# Patient Record
Sex: FEMALE | Race: WHITE | NOT HISPANIC OR LATINO | Employment: FULL TIME | URBAN - METROPOLITAN AREA
[De-identification: names, ages, dates, MRNs, and addresses within clinical notes are randomized per-mention and may not be internally consistent; named-entity substitution may affect disease eponyms.]

---

## 2018-04-30 ENCOUNTER — OFFICE VISIT (OUTPATIENT)
Dept: PEDIATRICS | Facility: CLINIC | Age: 28
End: 2018-04-30
Payer: COMMERCIAL

## 2018-04-30 VITALS
WEIGHT: 218 LBS | HEART RATE: 101 BPM | HEIGHT: 66 IN | BODY MASS INDEX: 35.03 KG/M2 | SYSTOLIC BLOOD PRESSURE: 110 MMHG | DIASTOLIC BLOOD PRESSURE: 64 MMHG | TEMPERATURE: 99.1 F | OXYGEN SATURATION: 98 %

## 2018-04-30 DIAGNOSIS — F32.0 MILD SINGLE CURRENT EPISODE OF MAJOR DEPRESSIVE DISORDER (H): Primary | ICD-10-CM

## 2018-04-30 LAB
ERYTHROCYTE [DISTWIDTH] IN BLOOD BY AUTOMATED COUNT: 12 % (ref 10–15)
HCT VFR BLD AUTO: 40.8 % (ref 35–47)
HGB BLD-MCNC: 14.1 G/DL (ref 11.7–15.7)
MCH RBC QN AUTO: 30.5 PG (ref 26.5–33)
MCHC RBC AUTO-ENTMCNC: 34.6 G/DL (ref 31.5–36.5)
MCV RBC AUTO: 88 FL (ref 78–100)
PLATELET # BLD AUTO: 232 10E9/L (ref 150–450)
RBC # BLD AUTO: 4.62 10E12/L (ref 3.8–5.2)
WBC # BLD AUTO: 9.6 10E9/L (ref 4–11)

## 2018-04-30 PROCEDURE — 84443 ASSAY THYROID STIM HORMONE: CPT | Performed by: PEDIATRICS

## 2018-04-30 PROCEDURE — 85027 COMPLETE CBC AUTOMATED: CPT | Performed by: PEDIATRICS

## 2018-04-30 PROCEDURE — 99214 OFFICE O/P EST MOD 30 MIN: CPT | Performed by: PEDIATRICS

## 2018-04-30 PROCEDURE — 36415 COLL VENOUS BLD VENIPUNCTURE: CPT | Performed by: PEDIATRICS

## 2018-04-30 ASSESSMENT — ANXIETY QUESTIONNAIRES
1. FEELING NERVOUS, ANXIOUS, OR ON EDGE: MORE THAN HALF THE DAYS
GAD7 TOTAL SCORE: 10
3. WORRYING TOO MUCH ABOUT DIFFERENT THINGS: SEVERAL DAYS
7. FEELING AFRAID AS IF SOMETHING AWFUL MIGHT HAPPEN: MORE THAN HALF THE DAYS
2. NOT BEING ABLE TO STOP OR CONTROL WORRYING: MORE THAN HALF THE DAYS
6. BECOMING EASILY ANNOYED OR IRRITABLE: NEARLY EVERY DAY
5. BEING SO RESTLESS THAT IT IS HARD TO SIT STILL: NOT AT ALL
IF YOU CHECKED OFF ANY PROBLEMS ON THIS QUESTIONNAIRE, HOW DIFFICULT HAVE THESE PROBLEMS MADE IT FOR YOU TO DO YOUR WORK, TAKE CARE OF THINGS AT HOME, OR GET ALONG WITH OTHER PEOPLE: SOMEWHAT DIFFICULT

## 2018-04-30 ASSESSMENT — PATIENT HEALTH QUESTIONNAIRE - PHQ9: 5. POOR APPETITE OR OVEREATING: NOT AT ALL

## 2018-04-30 NOTE — MR AVS SNAPSHOT
After Visit Summary   4/30/2018    Afia Cook    MRN: [id number]           Patient Information     Date Of Birth          1990        Visit Information        Provider Department      4/30/2018 1:20 PM Cristiane Jimenez MD [facility]        Today's Diagnoses     Mild single current episode of major depressive disorder (H)    -  1      Care Instructions    Labs today.    Start zoloft - taper up as instructed.    Follow up in 4-6 weeks.          Follow-ups after your visit        Follow-up notes from your care team     Return in about 4 weeks (around 5/28/2018) for med check.      Who to contact     If you have questions or need follow up information about today's clinic visit or your schedule please contact [facility] directly at [phone].  Normal or non-critical lab and imaging results will be communicated to you by MyChart, letter or phone within 4 business days after the clinic has received the results. If you do not hear from us within 7 days, please contact the clinic through [employer] or phone. If you have a critical or abnormal lab result, we will notify you by phone as soon as possible.  Submit refill requests through [employer] or call your pharmacy and they will forward the refill request to us. Please allow 3 business days for your refill to be completed.          Additional Information About Your Visit        MyChart Information     [employer] gives you secure access to your electronic health record. If you see a primary care provider, you can also send messages to your care team and make appointments. If you have questions, please call your primary care clinic.  If you do not have a primary care provider, please call [phone] and they will assist you.        Care EveryWhere ID     This is your Care EveryWhere ID. This could be used by other organizations to access your [facility] medical records  [id number]        Your Vitals Were     Pulse Temperature  "Height Pulse Oximetry BMI (Body Mass Index)       101 99.1  F (37.3  C) (Oral) 5' 6\" (1.676 m) 98% 35.19 kg/m2        Blood Pressure from Last 3 Encounters:   04/30/18 110/64   11/16/16 106/62   09/25/15 92/62    Weight from Last 3 Encounters:   04/30/18 218 lb (98.9 kg)   11/16/16 196 lb 2 oz (89 kg)   09/25/15 191 lb 2 oz (86.7 kg)              We Performed the Following     CBC with platelets     TSH with free T4 reflex          Today's Medication Changes          These changes are accurate as of 4/30/18  2:10 PM.  If you have any questions, ask your nurse or doctor.               Start taking these medicines.        Dose/Directions    sertraline 50 MG tablet   Commonly known as:  ZOLOFT   Used for:  Mild single current episode of major depressive disorder (H)   Started by:  Cristiane Jimenez MD        Take 1/2 tablet (25 mg) for 1-2 weeks, then increase to 1 tablet orally daily   Quantity:  30 tablet   Refills:  1            Where to get your medicines      These medications were sent to [facility] - [address]   [address], [place] [address]     Phone:  [phone]     sertraline 50 MG tablet                Primary Care Provider Office Phone # Fax #    Vicky New [phone]       [address] [place] [address]        Equal Access to Services     [facility] AH: Hadii jinny corea hadasho Soomaali, waaxda luqadaha, qaybta kaalmada sandra, lenin nix. So [facility] [phone].    ATENCIÓN: Si ana morales, tiene a almanza disposición servicios gratuitos de asistencia lingüística. Llame al [phone].    We comply with applicable federal civil rights laws and [place] laws. We do not discriminate on the basis of race, color, national origin, age, disability, sex, sexual orientation, or gender identity.            Thank you!     Thank you for choosing [facility]  for " your care. Our goal is always to provide you with excellent care. Hearing back from our patients is one way we can continue to improve our services. Please take a few minutes to complete the written survey that you may receive in the mail after your visit with us. Thank you!             Your Updated Medication List - Protect others around you: Learn how to safely use, store and throw away your medicines at www.disposemymeds.org.          This list is accurate as of 4/30/18  2:10 PM.  Always use your most recent med list.                   Brand Name Dispense Instructions for use Diagnosis    cetirizine 10 MG tablet    zyrTEC    30 tablet    Take 10 mg by mouth as needed In the spring and allergies        MIRENA (52 MG) 20 MCG/24HR IUD   Generic drug:  levonorgestrel      1 each by Intrauterine route once        sertraline 50 MG tablet    ZOLOFT    30 tablet    Take 1/2 tablet (25 mg) for 1-2 weeks, then increase to 1 tablet orally daily    Mild single current episode of major depressive disorder (H)       triamcinolone 0.1 % cream    KENALOG    60 g    Apply to affected area BID    Eczema

## 2018-04-30 NOTE — PROGRESS NOTES
"  SUBJECTIVE:   Afia Cook is a 27 year old female who presents to clinic today for the following health issues:      Abnormal Mood Symptoms      Duration:     Description:  Depression: YES  Anxiety: YES  Panic attacks: no      Accompanying signs and symptoms: see PHQ-9 and LATISHA scores    History (similar episodes/previous evaluation): Sees Therapist weekly.    Precipitating or alleviating factors: None    Therapies tried and outcome: none    Patient states she has had \"depression\" since college - her mom noticed her last year of college and encouraged her to see a therapist. 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  Now working at [employer] in [employer] and has been seeing same therapist for the past 3 years.  Going ok, but then in Jan or Feb started to feel down - she is doing everything \"non-medical\" - walking more, seeing chiropractor.  Hasn't been sleeping well - never feels rested in AM.  8 hours seems like a min amount of sleep for her.  Mild snoring. Taking Vit D, MVI, fish oil.  Noticed mood worsens around period her last cycle - but previously unrelated.  Crying more.  Diet same.    Problem list and histories reviewed & adjusted, as indicated.  Additional history: as documented    Reviewed and updated as needed this visit by clinical staff       Reviewed and updated as needed this visit by Provider         ROS:  Constitutional, HEENT, cardiovascular, pulmonary, gi and gu systems are negative, except as otherwise noted.    OBJECTIVE:     /64 (BP Location: Right arm, Cuff Size: Adult Large)  Pulse 101  Temp 99.1  F (37.3  C) (Oral)  Ht 5' 6\" (1.676 m)  Wt 218 lb (98.9 kg)  SpO2 98%  BMI 35.19 kg/m2  Body mass index is 35.19 kg/(m^2).  GENERAL APPEARANCE: healthy, alert and no distress  CV: regular rates and rhythm, normal S1 S2, no S3 or S4 and no murmur, click or rub    Diagnostic Test Results:  none     ASSESSMENT/PLAN:       1. Mild single current episode of major depressive " disorder (H)  New diagnosis - first time on medications - slow taper. Continue other non med therapies and follow up in 4-6 weeks.  Consider sleep study in future due to poor sleep - likely making this worse. R/o thyroid or anemia as causes first.  - sertraline (ZOLOFT) 50 MG tablet; Take 1/2 tablet (25 mg) for 1-2 weeks, then increase to 1 tablet orally daily  Dispense: 30 tablet; Refill: 1  - TSH with free T4 reflex  - CBC with platelets    Patient Instructions   Labs today.    Start zoloft - taper up as instructed.    Follow up in 4-6 weeks.      Cristiane Jimenez MD  [facility]

## 2018-05-01 LAB — TSH SERPL DL<=0.005 MIU/L-ACNC: 1.88 MU/L (ref 0.4–4)

## 2018-05-01 ASSESSMENT — ANXIETY QUESTIONNAIRES: GAD7 TOTAL SCORE: 10

## 2018-05-01 ASSESSMENT — PATIENT HEALTH QUESTIONNAIRE - PHQ9: SUM OF ALL RESPONSES TO PHQ QUESTIONS 1-9: 19

## 2018-06-25 ENCOUNTER — OFFICE VISIT (OUTPATIENT)
Dept: PEDIATRICS | Facility: CLINIC | Age: 28
End: 2018-06-25
Payer: COMMERCIAL

## 2018-06-25 VITALS
HEIGHT: 66 IN | OXYGEN SATURATION: 98 % | TEMPERATURE: 99.3 F | WEIGHT: 210 LBS | HEART RATE: 84 BPM | BODY MASS INDEX: 33.75 KG/M2 | SYSTOLIC BLOOD PRESSURE: 110 MMHG | DIASTOLIC BLOOD PRESSURE: 70 MMHG

## 2018-06-25 DIAGNOSIS — F32.0 MILD SINGLE CURRENT EPISODE OF MAJOR DEPRESSIVE DISORDER (H): ICD-10-CM

## 2018-06-25 PROCEDURE — 99213 OFFICE O/P EST LOW 20 MIN: CPT | Performed by: PEDIATRICS

## 2018-06-25 ASSESSMENT — ANXIETY QUESTIONNAIRES
IF YOU CHECKED OFF ANY PROBLEMS ON THIS QUESTIONNAIRE, HOW DIFFICULT HAVE THESE PROBLEMS MADE IT FOR YOU TO DO YOUR WORK, TAKE CARE OF THINGS AT HOME, OR GET ALONG WITH OTHER PEOPLE: NOT DIFFICULT AT ALL
7. FEELING AFRAID AS IF SOMETHING AWFUL MIGHT HAPPEN: NOT AT ALL
6. BECOMING EASILY ANNOYED OR IRRITABLE: NOT AT ALL
2. NOT BEING ABLE TO STOP OR CONTROL WORRYING: NOT AT ALL
5. BEING SO RESTLESS THAT IT IS HARD TO SIT STILL: NOT AT ALL
1. FEELING NERVOUS, ANXIOUS, OR ON EDGE: SEVERAL DAYS
GAD7 TOTAL SCORE: 1
3. WORRYING TOO MUCH ABOUT DIFFERENT THINGS: NOT AT ALL

## 2018-06-25 ASSESSMENT — PATIENT HEALTH QUESTIONNAIRE - PHQ9: 5. POOR APPETITE OR OVEREATING: NOT AT ALL

## 2018-06-25 NOTE — MR AVS SNAPSHOT
After Visit Summary   6/25/2018    Afia Cook    MRN: [id number]           Patient Information     Date Of Birth          1990        Visit Information        Provider Department      6/25/2018 8:40 AM Cristiane Jimenez MD [facility]        Today's Diagnoses     Mild single current episode of major depressive disorder (H)          Care Instructions    Continue 50mg dose.    FOLLOW UP in late September, early October for physical with PAP (also consider increasing the zoloft does).          Follow-ups after your visit        Who to contact     If you have questions or need follow up information about today's clinic visit or your schedule please contact [facility] directly at [phone].  Normal or non-critical lab and imaging results will be communicated to you by MyChart, letter or phone within 4 business days after the clinic has received the results. If you do not hear from us within 7 days, please contact the clinic through [employer] or phone. If you have a critical or abnormal lab result, we will notify you by phone as soon as possible.  Submit refill requests through [employer] or call your pharmacy and they will forward the refill request to us. Please allow 3 business days for your refill to be completed.          Additional Information About Your Visit        MyChart Information     [employer] gives you secure access to your electronic health record. If you see a primary care provider, you can also send messages to your care team and make appointments. If you have questions, please call your primary care clinic.  If you do not have a primary care provider, please call [phone] and they will assist you.        Care EveryWhere ID     This is your Care EveryWhere ID. This could be used by other organizations to access your [facility] medical records  [id number]        Your Vitals Were     Pulse Temperature Height Pulse Oximetry BMI (Body Mass Index)       84 99.3  " F (37.4  C) (Oral) 5' 6\" (1.676 m) 98% 33.89 kg/m2        Blood Pressure from Last 3 Encounters:   06/25/18 110/70   04/30/18 110/64   11/16/16 106/62    Weight from Last 3 Encounters:   06/25/18 210 lb (95.3 kg)   04/30/18 218 lb (98.9 kg)   11/16/16 196 lb 2 oz (89 kg)              Today, you had the following     No orders found for display         Today's Medication Changes          These changes are accurate as of 6/25/18  9:15 AM.  If you have any questions, ask your nurse or doctor.               These medicines have changed or have updated prescriptions.        Dose/Directions    sertraline 50 MG tablet   Commonly known as:  ZOLOFT   This may have changed:    - how much to take  - how to take this  - when to take this  - additional instructions   Used for:  Mild single current episode of major depressive disorder (H)   Changed by:  Cristiane Jimenez MD        Dose:  50 mg   Take 1 tablet (50 mg) by mouth daily   Quantity:  90 tablet   Refills:  1            Where to get your medicines      These medications were sent to [facility] - [address]   [address], [place] [address]     Phone:  [phone]     sertraline 50 MG tablet                Primary Care Provider Office Phone # Fax #    Vicky New [phone]       [address] [place] [address]        Equal Access to Services     [facility] AH: Hadii aad ku hadasho Soomaali, waaxda luqadaha, qaybta kaalmada adeegyada, waxay zina valderrama . So [facility] [phone].    ATENCIÓN: Si habla español, tiene a almanza disposición servicios gratuitos de asistencia lingüística. Llame al [phone].    We comply with applicable federal civil rights laws and [place] laws. We do not discriminate on the basis of race, color, national origin, age, disability, sex, sexual orientation, or gender identity.            Thank you!     Thank you " for choosing [facility]  for your care. Our goal is always to provide you with excellent care. Hearing back from our patients is one way we can continue to improve our services. Please take a few minutes to complete the written survey that you may receive in the mail after your visit with us. Thank you!             Your Updated Medication List - Protect others around you: Learn how to safely use, store and throw away your medicines at www.disposemymeds.org.          This list is accurate as of 6/25/18  9:15 AM.  Always use your most recent med list.                   Brand Name Dispense Instructions for use Diagnosis    cetirizine 10 MG tablet    zyrTEC    30 tablet    Take 10 mg by mouth as needed In the spring and allergies        MIRENA (52 MG) 20 MCG/24HR IUD   Generic drug:  levonorgestrel      1 each by Intrauterine route once        sertraline 50 MG tablet    ZOLOFT    90 tablet    Take 1 tablet (50 mg) by mouth daily    Mild single current episode of major depressive disorder (H)       triamcinolone 0.1 % cream    KENALOG    60 g    Apply to affected area BID    Eczema

## 2018-06-25 NOTE — PROGRESS NOTES
"  SUBJECTIVE:   Afia Cook is a 28 year old female who presents to clinic today for the following health issues:      Medication Followup of  Zoloft    Taking Medication as prescribed: yes    Side Effects:  None    Medication Helping Symptoms:  yes     Overall things going much better - feels calmer, able to react better to things.  Exercising more - was on recent trip to [place] and eating better.  Still seeing therapist.    Wt Readings from Last 3 Encounters:   06/25/18 210 lb (95.3 kg)   04/30/18 218 lb (98.9 kg)   11/16/16 196 lb 2 oz (89 kg)     Sleep is still challenging - also dealing with recent time change. Hard for her to fall asleep, but part of it she thinks is being consistent about going to bed.     Problem list and histories reviewed & adjusted, as indicated.  Additional history: as documented    Reviewed and updated as needed this visit by clinical staff       Reviewed and updated as needed this visit by Provider         ROS:  Constitutional, HEENT, cardiovascular, pulmonary, gi and gu systems are negative, except as otherwise noted.    OBJECTIVE:     /70 (BP Location: Right arm, Cuff Size: Adult Large)  Pulse 84  Temp 99.3  F (37.4  C) (Oral)  Ht 5' 6\" (1.676 m)  Wt 210 lb (95.3 kg)  SpO2 98%  BMI 33.89 kg/m2  Body mass index is 33.89 kg/(m^2).  GENERAL APPEARANCE: healthy, alert and no distress  RESP: lungs clear to auscultation - no rales, rhonchi or wheezes  CV: regular rates and rhythm, normal S1 S2, no S3 or S4 and no murmur, click or rub    Diagnostic Test Results:  none     ASSESSMENT/PLAN:       1. Mild single current episode of major depressive disorder (H)  Improved - continue current dose for now - may consider increasing for winter - will discuss at physical in October. Also patient may want to consider sleep study in the future, but will hold off for now.  - sertraline (ZOLOFT) 50 MG tablet; Take 1 tablet (50 mg) by mouth daily  Dispense: 90 tablet; Refill: 1    Patient " Instructions   Continue 50mg dose.    FOLLOW UP in late September, early October for physical with PAP (also consider increasing the zoloft does).      Cristiane Jimenez MD  [facility]

## 2018-06-26 ASSESSMENT — PATIENT HEALTH QUESTIONNAIRE - PHQ9: SUM OF ALL RESPONSES TO PHQ QUESTIONS 1-9: 5

## 2018-06-26 ASSESSMENT — ANXIETY QUESTIONNAIRES: GAD7 TOTAL SCORE: 1

## 2018-07-19 ENCOUNTER — OFFICE VISIT (OUTPATIENT)
Dept: PEDIATRICS | Facility: CLINIC | Age: 28
End: 2018-07-19
Payer: COMMERCIAL

## 2018-07-19 VITALS
SYSTOLIC BLOOD PRESSURE: 100 MMHG | DIASTOLIC BLOOD PRESSURE: 74 MMHG | WEIGHT: 211 LBS | HEART RATE: 80 BPM | RESPIRATION RATE: 16 BRPM | TEMPERATURE: 99.2 F | BODY MASS INDEX: 34.06 KG/M2

## 2018-07-19 DIAGNOSIS — R19.7 DIARRHEA, UNSPECIFIED TYPE: Primary | ICD-10-CM

## 2018-07-19 DIAGNOSIS — R19.7 DIARRHEA, UNSPECIFIED TYPE: ICD-10-CM

## 2018-07-19 PROCEDURE — 87506 IADNA-DNA/RNA PROBE TQ 6-11: CPT | Performed by: PHYSICIAN ASSISTANT

## 2018-07-19 PROCEDURE — 99213 OFFICE O/P EST LOW 20 MIN: CPT | Performed by: PHYSICIAN ASSISTANT

## 2018-07-19 NOTE — PATIENT INSTRUCTIONS
Diarrhea with Uncertain Cause (Adult)    Diarrhea is when stools are loose and watery. This can be caused by:    Viral infections    Bacterial infections    Food poisoning    Parasites    Irritable bowel syndrome (IBS)    Inflammatory bowel diseases such as ulcerative colitis, Crohn's disease, and celiac disease    Food intolerance, such as to lactose, the sugar found in milk and milk products    Reaction to medicines like antibiotics, laxatives, cancer drugs, and antacids  Along with diarrhea, you may also have:    Abdominal pain and cramping    Nausea and vomiting    Loss of bowel control    Fever and chills    Bloody stools  In some cases, antibiotics may help to treat diarrhea. You may have a stool sample test. This is done to see what is causing your diarrhea, and if antibiotics will help treat it. The results of a stool sample test may take up to 2 days. The healthcare provider may not give you antibiotics until he or she has the stool test results.  Diarrhea can cause dehydration. This is the loss of too much water and other fluids from the body. When this occurs, body fluid must be replaced. This can be done with oral rehydration solutions. Oral rehydration solutions are available at drugstores and grocery stores without a prescription.  Home care  Follow all instructions given by your healthcare provider. Rest at home for the next 24 hours, or until you feel better. Avoid caffeine, tobacco, and alcohol. These can make diarrhea, cramping, and pain worse.  If taking medicines:    Don t take over-the-counter diarrhea or nausea medicines unless your healthcare provider tells you to.    You may use acetaminophen or NSAID medicines like ibuprofen or naproxen to reduce pain and fever. Don t use these if you have chronic liver or kidney disease, or ever had a stomach ulcer or gastrointestinal bleeding. Don't use NSAID medicines if you are already taking one for another condition (like arthritis) or are on daily  aspirin therapy (such as for heart disease or after a stroke). Talk with your healthcare provider first.    If antibiotics were prescribed, be sure you take them until they are finished. Don t stop taking them even when you feel better. Antibiotics must be taken as a full course.  To prevent the spread of illness:    Remember that washing with soap and water and using alcohol-based  is the best way to prevent the spread of infection.    Clean the toilet after each use.    Wash your hands before eating.    Wash your hands before and after preparing food. Keep in mind that people with diarrhea or vomiting should not prepare food for others.    Wash your hands after using cutting boards, countertops, and knives that have been in contact with raw foods.    Wash and then peel fruits and vegetables.    Keep uncooked meats away from cooked and ready-to-eat foods.    Use a food thermometer when cooking. Cook poultry to at least 165 F (74 C). Cook ground meat (beef, veal, pork, lamb) to at least 160 F (71 C). Cook fresh beef, veal, lamb, and pork to at least 145 F (63 C).    Don t eat raw or undercooked eggs (poached or dilcia side up), poultry, meat, or unpasteurized milk and juices.  Food and drinks  The main goal while treating vomiting or diarrhea is to prevent dehydration. This is done by taking small amounts of liquids often.    Keep in mind that liquids are more important than food right now.    Drink only small amounts of liquids at a time.    Don t force yourself to eat, especially if you are having cramping, vomiting, or diarrhea. Don t eat large amounts at a time, even if you are hungry.    If you eat, avoid fatty, greasy, spicy, or fried foods.    Don t eat dairy foods or drink milk if you have diarrhea. These can make diarrhea worse.  During the first 24 hours you can try:    Oral rehydration solutions. Do not use sports drinks. They have too much sugar and not enough electrolytes.    Soft drinks without  caffeine    Ginger ale    Water (plain or flavored)    Decaf tea or coffee    Clear broth, consommé, or bouillon    Gelatin, popsicles, or frozen fruit juice bars  The second 24 hours, if you are feeling better, you can add:    Hot cereal, plain toast, bread, rolls, or crackers    Plain noodles, rice, mashed potatoes, chicken noodle soup, or rice soup    Unsweetened canned fruit (no pineapple)    Bananas  As you recover:    Limit fat intake to less than 15 grams per day. Don t eat margarine, butter, oils, mayonnaise, sauces, gravies, fried foods, peanut butter, meat, poultry, or fish.    Limit fiber. Don t eat raw or cooked vegetables, fresh fruits except bananas, or bran cereals.    Limit caffeine and chocolate.    Limit dairy.    Don t use spices or seasonings except salt.    Go back to your normal diet over time, as you feel better and your symptoms improve.    If the symptoms come back, go back to a simple diet or clear liquids.  Follow-up care  Follow up with your healthcare provider, or as advised. If a stool sample was taken or cultures were done, call the healthcare provider for the results as instructed.  Call 911  Call 911 if you have any of these symptoms:    Trouble breathing    Confusion    Extreme drowsiness or trouble walking    Loss of consciousness    Rapid heart rate    Chest pain    Stiff neck    Seizure  When to seek medical advice  Call your healthcare provider right away if any of these occur:    Abdominal pain that gets worse    Constant lower right abdominal pain    Continued vomiting and inability to keep liquids down    Diarrhea more than 5 times a day    Blood in vomit or stool    Dark urine or no urine for 8 hours, dry mouth and tongue, tiredness, weakness, or dizziness    Drowsiness    New rash    You don t get better in 2 to 3 days    Fever of 100.4 F (38 C) or higher, or as directed by your healthcare provider  Date Last Reviewed: 1/3/2016    2523-7968 [employer] [address]  [place] [address]. All rights reserved. This information is not intended as a substitute for professional medical care. Always follow your healthcare professional's instructions.

## 2018-07-19 NOTE — MR AVS SNAPSHOT
After Visit Summary   7/19/2018    Afia Cook    MRN: [id number]           Patient Information     Date Of Birth          1990        Visit Information        Provider Department      7/19/2018 2:40 PM [address] Whitfield PA-C [facility] (formerly [facility])        Today's Diagnoses     Diarrhea, unspecified type    -  1      Care Instructions      Diarrhea with Uncertain Cause (Adult)    Diarrhea is when stools are loose and watery. This can be caused by:    Viral infections    Bacterial infections    Food poisoning    Parasites    Irritable bowel syndrome (IBS)    Inflammatory bowel diseases such as ulcerative colitis, Crohn's disease, and celiac disease    Food intolerance, such as to lactose, the sugar found in milk and milk products    Reaction to medicines like antibiotics, laxatives, cancer drugs, and antacids  Along with diarrhea, you may also have:    Abdominal pain and cramping    Nausea and vomiting    Loss of bowel control    Fever and chills    Bloody stools  In some cases, antibiotics may help to treat diarrhea. You may have a stool sample test. This is done to see what is causing your diarrhea, and if antibiotics will help treat it. The results of a stool sample test may take up to 2 days. The healthcare provider may not give you antibiotics until he or she has the stool test results.  Diarrhea can cause dehydration. This is the loss of too much water and other fluids from the body. When this occurs, body fluid must be replaced. This can be done with oral rehydration solutions. Oral rehydration solutions are available at drugstores and grocery stores without a prescription.  Home care  Follow all instructions given by your healthcare provider. Rest at home for the next 24 hours, or until you feel better. Avoid caffeine, tobacco, and alcohol. These can make diarrhea, cramping, and pain worse.  If taking medicines:    Don t take over-the-counter diarrhea or nausea medicines unless  your healthcare provider tells you to.    You may use acetaminophen or NSAID medicines like ibuprofen or naproxen to reduce pain and fever. Don t use these if you have chronic liver or kidney disease, or ever had a stomach ulcer or gastrointestinal bleeding. Don't use NSAID medicines if you are already taking one for another condition (like arthritis) or are on daily aspirin therapy (such as for heart disease or after a stroke). Talk with your healthcare provider first.    If antibiotics were prescribed, be sure you take them until they are finished. Don t stop taking them even when you feel better. Antibiotics must be taken as a full course.  To prevent the spread of illness:    Remember that washing with soap and water and using alcohol-based  is the best way to prevent the spread of infection.    Clean the toilet after each use.    Wash your hands before eating.    Wash your hands before and after preparing food. Keep in mind that people with diarrhea or vomiting should not prepare food for others.    Wash your hands after using cutting boards, countertops, and knives that have been in contact with raw foods.    Wash and then peel fruits and vegetables.    Keep uncooked meats away from cooked and ready-to-eat foods.    Use a food thermometer when cooking. Cook poultry to at least 165 F (74 C). Cook ground meat (beef, veal, pork, lamb) to at least 160 F (71 C). Cook fresh beef, veal, lamb, and pork to at least 145 F (63 C).    Don t eat raw or undercooked eggs (poached or dilcia side up), poultry, meat, or unpasteurized milk and juices.  Food and drinks  The main goal while treating vomiting or diarrhea is to prevent dehydration. This is done by taking small amounts of liquids often.    Keep in mind that liquids are more important than food right now.    Drink only small amounts of liquids at a time.    Don t force yourself to eat, especially if you are having cramping, vomiting, or diarrhea. Don t eat  large amounts at a time, even if you are hungry.    If you eat, avoid fatty, greasy, spicy, or fried foods.    Don t eat dairy foods or drink milk if you have diarrhea. These can make diarrhea worse.  During the first 24 hours you can try:    Oral rehydration solutions. Do not use sports drinks. They have too much sugar and not enough electrolytes.    Soft drinks without caffeine    Ginger ale    Water (plain or flavored)    Decaf tea or coffee    Clear broth, consommé, or bouillon    Gelatin, popsicles, or frozen fruit juice bars  The second 24 hours, if you are feeling better, you can add:    Hot cereal, plain toast, bread, rolls, or crackers    Plain noodles, rice, mashed potatoes, chicken noodle soup, or rice soup    Unsweetened canned fruit (no pineapple)    Bananas  As you recover:    Limit fat intake to less than 15 grams per day. Don t eat margarine, butter, oils, mayonnaise, sauces, gravies, fried foods, peanut butter, meat, poultry, or fish.    Limit fiber. Don t eat raw or cooked vegetables, fresh fruits except bananas, or bran cereals.    Limit caffeine and chocolate.    Limit dairy.    Don t use spices or seasonings except salt.    Go back to your normal diet over time, as you feel better and your symptoms improve.    If the symptoms come back, go back to a simple diet or clear liquids.  Follow-up care  Follow up with your healthcare provider, or as advised. If a stool sample was taken or cultures were done, call the healthcare provider for the results as instructed.  Call 911  Call 911 if you have any of these symptoms:    Trouble breathing    Confusion    Extreme drowsiness or trouble walking    Loss of consciousness    Rapid heart rate    Chest pain    Stiff neck    Seizure  When to seek medical advice  Call your healthcare provider right away if any of these occur:    Abdominal pain that gets worse    Constant lower right abdominal pain    Continued vomiting and inability to keep liquids  down    Diarrhea more than 5 times a day    Blood in vomit or stool    Dark urine or no urine for 8 hours, dry mouth and tongue, tiredness, weakness, or dizziness    Drowsiness    New rash    You don t get better in 2 to 3 days    Fever of 100.4 F (38 C) or higher, or as directed by your healthcare provider  Date Last Reviewed: 1/3/2016    0268-6372 [employer]. [address] [place] [address], [place] [address]. All rights reserved. This information is not intended as a substitute for professional medical care. Always follow your healthcare professional's instructions.                Follow-ups after your visit        Future tests that were ordered for you today     Open Future Orders        Priority Expected Expires Ordered    Enteric Bacteria and Virus Panel by PALLAVI Stool Routine  7/19/2019 7/19/2018            Who to contact     If you have questions or need follow up information about today's clinic visit or your schedule please contact [facility] directly at [phone].  Normal or non-critical lab and imaging results will be communicated to you by [employer] Appshart, letter or phone within 4 business days after the clinic has received the results. If you do not hear from us within 7 days, please contact the clinic through Everwiset or phone. If you have a critical or abnormal lab result, we will notify you by phone as soon as possible.  Submit refill requests through WikiYou or call your pharmacy and they will forward the refill request to us. Please allow 3 business days for your refill to be completed.          Additional Information About Your Visit        [employer] Information     [employer]ikiYou gives you secure access to your electronic health record. If you see a primary care provider, you can also send messages to your care team and make appointments. If you have questions, please call your primary care clinic.  If you do not have a primary care provider, please call [phone] and they will assist you.         Care EveryWhere ID     This is your Care EveryWhere ID. This could be used by other organizations to access your [place] medical records  [id number]        Your Vitals Were     Pulse Temperature Respirations Last Period BMI (Body Mass Index)       80 99.2  F (37.3  C) (Oral) 16 07/19/2018 34.06 kg/m2        Blood Pressure from Last 3 Encounters:   07/19/18 100/74   06/25/18 110/70   04/30/18 110/64    Weight from Last 3 Encounters:   07/19/18 211 lb (95.7 kg)   06/25/18 210 lb (95.3 kg)   04/30/18 218 lb (98.9 kg)              We Performed the Following     Basic metabolic panel  (Ca, Cl, CO2, Creat, Gluc, K, Na, BUN)        Primary Care Provider Office Phone # Fax #    Vicky New [phone] [address] [place] [address]        Equal Access to Services     [facility]: Hadii aad ku hadasho Soomaali, waaxda luqadaha, qaybta kaalmada adeegyada, waxay hennain haynaseemn jerson valderrama . So [place] [phone].    ATENCIÓN: Si habla español, tiene a almanza disposición servicios gratuitos de asistencia lingüística. Llame al [phone].    We comply with applicable federal civil rights laws and [place] laws. We do not discriminate on the basis of race, color, national origin, age, disability, sex, sexual orientation, or gender identity.            Thank you!     Thank you for choosing [facility]  for your care. Our goal is always to provide you with excellent care. Hearing back from our patients is one way we can continue to improve our services. Please take a few minutes to complete the written survey that you may receive in the mail after your visit with us. Thank you!             Your Updated Medication List - Protect others around you: Learn how to safely use, store and throw away your medicines at www.disposemymeds.org.          This list is accurate as of 7/19/18  3:15 PM.  Always use your most recent med list.                   Brand Name  Dispense Instructions for use Diagnosis    cetirizine 10 MG tablet    zyrTEC    30 tablet    Take 10 mg by mouth as needed In the spring and allergies        MIRENA (52 MG) 20 MCG/24HR IUD   Generic drug:  levonorgestrel      1 each by Intrauterine route once        sertraline 50 MG tablet    ZOLOFT    90 tablet    Take 1 tablet (50 mg) by mouth daily    Mild single current episode of major depressive disorder (H)       triamcinolone 0.1 % cream    KENALOG    60 g    Apply to affected area BID    Eczema

## 2018-07-19 NOTE — PROGRESS NOTES
SUBJECTIVE:   Afia Cook is a 28 year old female who presents to clinic today for the following health issues:    Diarrhea      Duration: five days    Description:       Consistency of stool: watery       Blood in stool: no        Number of loose stools past 24 hours: 10 in 24 hours    Intensity:  severe    Accompanying signs and symptoms: emesis on Monday. Pt stated that she is currently on cycle also       Fever: no        Nausea/vomitting: YES       Abdominal pain: YES       Weight loss: no     History (recent antibiotics or travel/ill contacts/med changes/testing done): Pt stated there was a gas leak in apartment on Saturday night    Precipitating or alleviating factors: None    Therapies tried and outcome: anti-diarrheal    ROS:  10 point ROS negative except as listed above      OBJECTIVE:     /74  Pulse 80  Temp 99.2  F (37.3  C) (Oral)  Resp 16  Wt 211 lb (95.7 kg)  LMP 07/19/2018  BMI 34.06 kg/m2  Body mass index is 34.06 kg/(m^2).  GENERAL: healthy, alert and no distress  EYES: Eyes grossly normal to inspection, PERRL and conjunctivae and sclerae normal  HENT: ear canals and TM's normal, nose and mouth without ulcers or lesions  NECK: no adenopathy, no asymmetry, masses, or scars and thyroid normal to palpation  RESP: lungs clear to auscultation - no rales, rhonchi or wheezes  CV: regular rate and rhythm, normal S1 S2, no S3 or S4, no murmur, click or rub, no peripheral edema and peripheral pulses strong  ABDOMEN: soft, nontender, no hepatosplenomegaly, no masses and bowel sounds normal  MS: no gross musculoskeletal defects noted, no edema  SKIN: no suspicious lesions or rashes  NEURO: Normal strength and tone, mentation intact and speech normal  BACK: no CVA tenderness, no paralumbar tenderness    Diagnostic Test Results:  Stool Pending  CBC and BMP declined by patient    ASSESSMENT/PLAN:     (R19.7) Diarrhea, unspecified type  (primary encounter diagnosis)  Comment: CBC and BMP declined  by patient  Plan: Enteric Bacteria and Virus Panel by PALLAVI Stool,         CANCELED: Basic metabolic panel  (Ca, Cl, CO2,         Creat, Gluc, K, Na, BUN)    Patient Instructions     Diarrhea with Uncertain Cause (Adult)    Diarrhea is when stools are loose and watery. This can be caused by:    Viral infections    Bacterial infections    Food poisoning    Parasites    Irritable bowel syndrome (IBS)    Inflammatory bowel diseases such as ulcerative colitis, Crohn's disease, and celiac disease    Food intolerance, such as to lactose, the sugar found in milk and milk products    Reaction to medicines like antibiotics, laxatives, cancer drugs, and antacids  Along with diarrhea, you may also have:    Abdominal pain and cramping    Nausea and vomiting    Loss of bowel control    Fever and chills    Bloody stools  In some cases, antibiotics may help to treat diarrhea. You may have a stool sample test. This is done to see what is causing your diarrhea, and if antibiotics will help treat it. The results of a stool sample test may take up to 2 days. The healthcare provider may not give you antibiotics until he or she has the stool test results.  Diarrhea can cause dehydration. This is the loss of too much water and other fluids from the body. When this occurs, body fluid must be replaced. This can be done with oral rehydration solutions. Oral rehydration solutions are available at drugstores and grocery stores without a prescription.  Home care  Follow all instructions given by your healthcare provider. Rest at home for the next 24 hours, or until you feel better. Avoid caffeine, tobacco, and alcohol. These can make diarrhea, cramping, and pain worse.  If taking medicines:    Don t take over-the-counter diarrhea or nausea medicines unless your healthcare provider tells you to.    You may use acetaminophen or NSAID medicines like ibuprofen or naproxen to reduce pain and fever. Don t use these if you have chronic liver or kidney  disease, or ever had a stomach ulcer or gastrointestinal bleeding. Don't use NSAID medicines if you are already taking one for another condition (like arthritis) or are on daily aspirin therapy (such as for heart disease or after a stroke). Talk with your healthcare provider first.    If antibiotics were prescribed, be sure you take them until they are finished. Don t stop taking them even when you feel better. Antibiotics must be taken as a full course.  To prevent the spread of illness:    Remember that washing with soap and water and using alcohol-based  is the best way to prevent the spread of infection.    Clean the toilet after each use.    Wash your hands before eating.    Wash your hands before and after preparing food. Keep in mind that people with diarrhea or vomiting should not prepare food for others.    Wash your hands after using cutting boards, countertops, and knives that have been in contact with raw foods.    Wash and then peel fruits and vegetables.    Keep uncooked meats away from cooked and ready-to-eat foods.    Use a food thermometer when cooking. Cook poultry to at least 165 F (74 C). Cook ground meat (beef, veal, pork, lamb) to at least 160 F (71 C). Cook fresh beef, veal, lamb, and pork to at least 145 F (63 C).    Don t eat raw or undercooked eggs (poached or dilcia side up), poultry, meat, or unpasteurized milk and juices.  Food and drinks  The main goal while treating vomiting or diarrhea is to prevent dehydration. This is done by taking small amounts of liquids often.    Keep in mind that liquids are more important than food right now.    Drink only small amounts of liquids at a time.    Don t force yourself to eat, especially if you are having cramping, vomiting, or diarrhea. Don t eat large amounts at a time, even if you are hungry.    If you eat, avoid fatty, greasy, spicy, or fried foods.    Don t eat dairy foods or drink milk if you have diarrhea. These can make diarrhea  worse.  During the first 24 hours you can try:    Oral rehydration solutions. Do not use sports drinks. They have too much sugar and not enough electrolytes.    Soft drinks without caffeine    Ginger ale    Water (plain or flavored)    Decaf tea or coffee    Clear broth, consommé, or bouillon    Gelatin, popsicles, or frozen fruit juice bars  The second 24 hours, if you are feeling better, you can add:    Hot cereal, plain toast, bread, rolls, or crackers    Plain noodles, rice, mashed potatoes, chicken noodle soup, or rice soup    Unsweetened canned fruit (no pineapple)    Bananas  As you recover:    Limit fat intake to less than 15 grams per day. Don t eat margarine, butter, oils, mayonnaise, sauces, gravies, fried foods, peanut butter, meat, poultry, or fish.    Limit fiber. Don t eat raw or cooked vegetables, fresh fruits except bananas, or bran cereals.    Limit caffeine and chocolate.    Limit dairy.    Don t use spices or seasonings except salt.    Go back to your normal diet over time, as you feel better and your symptoms improve.    If the symptoms come back, go back to a simple diet or clear liquids.  Follow-up care  Follow up with your healthcare provider, or as advised. If a stool sample was taken or cultures were done, call the healthcare provider for the results as instructed.  Call 911  Call 911 if you have any of these symptoms:    Trouble breathing    Confusion    Extreme drowsiness or trouble walking    Loss of consciousness    Rapid heart rate    Chest pain    Stiff neck    Seizure  When to seek medical advice  Call your healthcare provider right away if any of these occur:    Abdominal pain that gets worse    Constant lower right abdominal pain    Continued vomiting and inability to keep liquids down    Diarrhea more than 5 times a day    Blood in vomit or stool    Dark urine or no urine for 8 hours, dry mouth and tongue, tiredness, weakness, or dizziness    Drowsiness    New rash    You don t  get better in 2 to 3 days    Fever of 100.4 F (38 C) or higher, or as directed by your healthcare provider  Date Last Reviewed: 1/3/2016    2945-2951 [employer]. [address] [place] [address], [place] [address]. All rights reserved. This information is not intended as a substitute for professional medical care. Always follow your healthcare professional's instructions.                  Rd Whitfield PA-C  [facility]

## 2018-08-02 ENCOUNTER — OFFICE VISIT (OUTPATIENT)
Dept: PEDIATRICS | Facility: CLINIC | Age: 28
End: 2018-08-02
Payer: COMMERCIAL

## 2018-08-02 VITALS
BODY MASS INDEX: 33.56 KG/M2 | WEIGHT: 207.9 LBS | SYSTOLIC BLOOD PRESSURE: 104 MMHG | HEART RATE: 76 BPM | DIASTOLIC BLOOD PRESSURE: 74 MMHG | TEMPERATURE: 98.7 F

## 2018-08-02 DIAGNOSIS — F32.0 MILD SINGLE CURRENT EPISODE OF MAJOR DEPRESSIVE DISORDER (H): ICD-10-CM

## 2018-08-02 DIAGNOSIS — A07.1 GIARDIA: ICD-10-CM

## 2018-08-02 DIAGNOSIS — R19.7 DIARRHEA, UNSPECIFIED TYPE: ICD-10-CM

## 2018-08-02 DIAGNOSIS — R19.7 DIARRHEA, UNSPECIFIED TYPE: Primary | ICD-10-CM

## 2018-08-02 LAB
ANION GAP SERPL CALCULATED.3IONS-SCNC: 7 MMOL/L (ref 3–14)
BUN SERPL-MCNC: 11 MG/DL (ref 7–30)
C DIFF TOX B STL QL: NEGATIVE
CALCIUM SERPL-MCNC: 8.7 MG/DL (ref 8.5–10.1)
CHLORIDE SERPL-SCNC: 108 MMOL/L (ref 94–109)
CO2 SERPL-SCNC: 25 MMOL/L (ref 20–32)
CREAT SERPL-MCNC: 0.67 MG/DL (ref 0.52–1.04)
CRP SERPL-MCNC: <2.9 MG/L (ref 0–8)
ERYTHROCYTE [DISTWIDTH] IN BLOOD BY AUTOMATED COUNT: 12.5 % (ref 10–15)
ERYTHROCYTE [SEDIMENTATION RATE] IN BLOOD BY WESTERGREN METHOD: 14 MM/H (ref 0–20)
GFR SERPL CREATININE-BSD FRML MDRD: >90 ML/MIN/1.7M2
GLUCOSE SERPL-MCNC: 86 MG/DL (ref 70–99)
HCT VFR BLD AUTO: 41.1 % (ref 35–47)
HGB BLD-MCNC: 14.2 G/DL (ref 11.7–15.7)
MCH RBC QN AUTO: 30.9 PG (ref 26.5–33)
MCHC RBC AUTO-ENTMCNC: 34.5 G/DL (ref 31.5–36.5)
MCV RBC AUTO: 90 FL (ref 78–100)
PLATELET # BLD AUTO: 266 10E9/L (ref 150–450)
POTASSIUM SERPL-SCNC: 4 MMOL/L (ref 3.4–5.3)
RBC # BLD AUTO: 4.59 10E12/L (ref 3.8–5.2)
SODIUM SERPL-SCNC: 140 MMOL/L (ref 133–144)
SPECIMEN SOURCE: NORMAL
WBC # BLD AUTO: 9.9 10E9/L (ref 4–11)

## 2018-08-02 PROCEDURE — 80048 BASIC METABOLIC PNL TOTAL CA: CPT | Performed by: PEDIATRICS

## 2018-08-02 PROCEDURE — 99213 OFFICE O/P EST LOW 20 MIN: CPT | Mod: GE | Performed by: STUDENT IN AN ORGANIZED HEALTH CARE EDUCATION/TRAINING PROGRAM

## 2018-08-02 PROCEDURE — 86140 C-REACTIVE PROTEIN: CPT | Performed by: PEDIATRICS

## 2018-08-02 PROCEDURE — 36415 COLL VENOUS BLD VENIPUNCTURE: CPT | Performed by: PEDIATRICS

## 2018-08-02 PROCEDURE — 87493 C DIFF AMPLIFIED PROBE: CPT | Performed by: PEDIATRICS

## 2018-08-02 PROCEDURE — 85652 RBC SED RATE AUTOMATED: CPT | Performed by: PEDIATRICS

## 2018-08-02 PROCEDURE — 85027 COMPLETE CBC AUTOMATED: CPT | Performed by: PEDIATRICS

## 2018-08-02 PROCEDURE — 87329 GIARDIA AG IA: CPT | Performed by: PEDIATRICS

## 2018-08-02 NOTE — PATIENT INSTRUCTIONS
We will call you with your labs results.  If the diarrhea does not resolve in the next week, please follow up in office and we will likely place a GI referral.   Can consider increasing your zoloft dosing as well at that time if symptoms have no settled out.     Self-Care for Vomiting and Diarrhea    Vomiting and diarrhea can make you miserable. Your stomach and bowels are reacting to an irritant. This might be food, medicine, or viral stomach flu. Vomiting and diarrhea are two ways your body can remove the problem from your system. Nausea is a symptom that discourages you from eating. This gives your stomach and bowels time to recover. To get back to normal, start with self-care to ease your discomfort.  Drink liquids  Drink or sip liquids to avoid losing too much fluid (dehydration):    Clear liquids such as water or broth are the best choices.    Do not drink beverages with a lot of sugar in them, such as juices and sodas. These can make diarrhea worse.    If you have severe vomiting, do not drink sport drinks, such as electrolyte solutions. These don't have the right mix of water, sugar, and minerals. They can also make the symptoms worse. In this situation, commercially available oral rehydration solutions are best.     Suck on ice chips if the thought of drinking something makes you queasy.  When you re able to eat again  Tips include the following:    As your appetite comes back, you can resume your normal diet.    Ask your healthcare provider whether you should stay away from any foods.  Medicines  Know the following about medicines:    Vomiting and diarrhea are ways your body uses to rid itself of harmful substances such as bacteria. DO NOT use antidiarrheal or antivomiting (antiemetic) medicines unless your healthcare provider tells you to do so.    Aspirin, medicine with aspirin, and many aspirin substitutes can irritate your stomach. So avoid them when you have stomach upset.    Certain prescription and  over-the-counter medicines can cause vomiting and diarrhea. Talk with your healthcare provider about any medicines you take that may be causing these symptoms.    Certain over-the-counter antihistamines can help control nausea. Other medicines can help soothe stomach upset. Ask your healthcare provider which medicines may help you.  When to call your healthcare provider  Call your healthcare provider if you have:    Bloody or black vomit or stools    Severe, steady belly pain    Vomiting with a severe headache or vomiting after a head injury    Vomiting and diarrhea together for more than an hour    An inability to hold down even sips of liquids for more than 12 hours    Vomiting that lasts more than 24 hours    Severe diarrhea that lasts more than 2 days    Yellowish color to your skin or the whites of your eyes    Inability to urinate. In infants and young children, not making wet diapers.    Date Last Reviewed: 6/1/2016 2000-2017 [employer]. [address] [place] [address], [place] [address]. All rights reserved. This information is not intended as a substitute for professional medical care. Always follow your healthcare professional's instructions.

## 2018-08-02 NOTE — MR AVS SNAPSHOT
After Visit Summary   8/2/2018    Afia Cook    MRN: [id number]           Patient Information     Date Of Birth          1990        Visit Information        Provider Department      8/2/2018 9:15 AM Riya Knowles MD [facility]        Today's Diagnoses     Diarrhea, unspecified type    -  1      Care Instructions    We will call you with your labs results.  If the diarrhea does not resolve in the next week, please follow up in office and we will likely place a GI referral.   Can consider increasing your zoloft dosing as well at that time if symptoms have no settled out.     Self-Care for Vomiting and Diarrhea    Vomiting and diarrhea can make you miserable. Your stomach and bowels are reacting to an irritant. This might be food, medicine, or viral stomach flu. Vomiting and diarrhea are two ways your body can remove the problem from your system. Nausea is a symptom that discourages you from eating. This gives your stomach and bowels time to recover. To get back to normal, start with self-care to ease your discomfort.  Drink liquids  Drink or sip liquids to avoid losing too much fluid (dehydration):    Clear liquids such as water or broth are the best choices.    Do not drink beverages with a lot of sugar in them, such as juices and sodas. These can make diarrhea worse.    If you have severe vomiting, do not drink sport drinks, such as electrolyte solutions. These don't have the right mix of water, sugar, and minerals. They can also make the symptoms worse. In this situation, commercially available oral rehydration solutions are best.     Suck on ice chips if the thought of drinking something makes you queasy.  When you re able to eat again  Tips include the following:    As your appetite comes back, you can resume your normal diet.    Ask your healthcare provider whether you should stay away from any foods.  Medicines  Know the following about medicines:    Vomiting and  diarrhea are ways your body uses to rid itself of harmful substances such as bacteria. DO NOT use antidiarrheal or antivomiting (antiemetic) medicines unless your healthcare provider tells you to do so.    Aspirin, medicine with aspirin, and many aspirin substitutes can irritate your stomach. So avoid them when you have stomach upset.    Certain prescription and over-the-counter medicines can cause vomiting and diarrhea. Talk with your healthcare provider about any medicines you take that may be causing these symptoms.    Certain over-the-counter antihistamines can help control nausea. Other medicines can help soothe stomach upset. Ask your healthcare provider which medicines may help you.  When to call your healthcare provider  Call your healthcare provider if you have:    Bloody or black vomit or stools    Severe, steady belly pain    Vomiting with a severe headache or vomiting after a head injury    Vomiting and diarrhea together for more than an hour    An inability to hold down even sips of liquids for more than 12 hours    Vomiting that lasts more than 24 hours    Severe diarrhea that lasts more than 2 days    Yellowish color to your skin or the whites of your eyes    Inability to urinate. In infants and young children, not making wet diapers.    Date Last Reviewed: 6/1/2016 2000-2017 [employer]. [address] [place] [address], [place] [address]. All rights reserved. This information is not intended as a substitute for professional medical care. Always follow your healthcare professional's instructions.                Follow-ups after your visit        Follow-up notes from your care team     Return if symptoms worsen or fail to improve.      Future tests that were ordered for you today     Open Future Orders        Priority Expected Expires Ordered    Giardia antigen Routine  8/2/2019 8/2/2018    Clostridium difficile Toxin B PCR Routine  9/1/2018 8/2/2018            Who to contact     If you have  questions or need follow up information about today's clinic visit or your schedule please contact [facility] directly at [phone].  Normal or non-critical lab and imaging results will be communicated to you by MyChart, letter or phone within 4 business days after the clinic has received the results. If you do not hear from us within 7 days, please contact the clinic through "[employer]hart or phone. If you have a critical or abnormal lab result, we will notify you by phone as soon as possible.  Submit refill requests through [employer] or call your pharmacy and they will forward the refill request to us. Please allow 3 business days for your refill to be completed.          Additional Information About Your Visit        "[employer] Information     [employer] gives you secure access to your electronic health record. If you see a primary care provider, you can also send messages to your care team and make appointments. If you have questions, please call your primary care clinic.  If you do not have a primary care provider, please call [phone] and they will assist you.        Care EveryWhere ID     This is your Care EveryWhere ID. This could be used by other organizations to access your [place] medical records  [id number]        Your Vitals Were     Pulse Temperature Last Period BMI (Body Mass Index)          76 98.7  F (37.1  C) (Oral) 07/19/2018 33.56 kg/m2         Blood Pressure from Last 3 Encounters:   08/02/18 104/74   07/19/18 100/74   06/25/18 110/70    Weight from Last 3 Encounters:   08/02/18 207 lb 14.4 oz (94.3 kg)   07/19/18 211 lb (95.7 kg)   06/25/18 210 lb (95.3 kg)              We Performed the Following     Basic metabolic panel  (Ca, Cl, CO2, Creat, Gluc, K, Na, BUN)     CBC with platelets     CRP, inflammation     ESR: Erythrocyte sedimentation rate        Primary Care Provider Office Phone # Fax #    Vicky New [phone] [address] [facility]  [place] [address]        Equal Access to Services     [facility]: Hadii aad ku hadroopadede Travis, wayamileda wescharissaha, qalillianasusan oliverkellenlenin huggins. So [facility] [phone].    ATENCIÓN: Si habla español, tiene a almanza disposición servicios gratuitos de asistencia lingüística. [facility] [phone].    We comply with applicable federal civil rights laws and [place] laws. We do not discriminate on the basis of race, color, national origin, age, disability, sex, sexual orientation, or gender identity.            Thank you!     Thank you for choosing [facility]  for your care. Our goal is always to provide you with excellent care. Hearing back from our patients is one way we can continue to improve our services. Please take a few minutes to complete the written survey that you may receive in the mail after your visit with us. Thank you!             Your Updated Medication List - Protect others around you: Learn how to safely use, store and throw away your medicines at www.disposemymeds.org.          This list is accurate as of 8/2/18 10:09 AM.  Always use your most recent med list.                   Brand Name Dispense Instructions for use Diagnosis    cetirizine 10 MG tablet    zyrTEC    30 tablet    Take 10 mg by mouth as needed In the spring and allergies        MIRENA (52 MG) 20 MCG/24HR IUD   Generic drug:  levonorgestrel      1 each by Intrauterine route once        sertraline 50 MG tablet    ZOLOFT    90 tablet    Take 1 tablet (50 mg) by mouth daily    Mild single current episode of major depressive disorder (H)       triamcinolone 0.1 % cream    KENALOG    60 g    Apply to affected area BID    Eczema

## 2018-08-02 NOTE — PROGRESS NOTES
"  SUBJECTIVE:   Afia Cook is a 28 year old female who presents to clinic today for the following health issues:    FU GI Issues, diarrhea      Duration: three weeks ago    Description (location/character/radiation): Diarrhea off/on x 3 weeks    Intensity:  moderate    Accompanying signs and symptoms: Sx are not subsiding a whole lot. Sx of dizziness and loss of focus    History (similar episodes/previous evaluation): Stool culture negative.     Precipitating or alleviating factors: None    Therapies tried and outcome: Immodium in past     Diarrhea daily - 5-6 times per day, worse in the morning. Vomiting resolved. No fevers. No pain or cramping. No significant diet changes - has been eating soft diet - avoid red meats and dairy for the last 3 weeks without improvement. No recent medication changes. No increase in coffee intake. Went camping in early July, [place]. No melana, BRBPR, mucous in stools. Stools are watery to loose with chunks in consistency. Has had maybe 2-3 formed stools in the last 2 weeks.    PROBLEMS TO ADD ON...  Patient is worried her depression is worsening since the diarrhea onset. Feels her depressed mood \"creeping over her shoulder.\" Worried about medication absorption in the setting of diarrhea and wondering if she should increase her dosing.     Problem list and histories reviewed & adjusted, as indicated.  Additional history: as documented    Patient Active Problem List   Diagnosis     Viral warts     CARDIOVASCULAR SCREENING; LDL GOAL LESS THAN 160     Vesicular palmoplantar eczema     Obesity     Encounter for IUD insertion     Mild single current episode of major depressive disorder (H)     History reviewed. No pertinent surgical history.    Social History   Substance Use Topics     Smoking status: Current Every Day Smoker     Types: Cigars, Cigarettes     Smokeless tobacco: Never Used      Comment: smoke free home     Alcohol use Yes      Comment: Social     Family " History   Problem Relation Age of Onset     Breast Cancer Maternal Grandmother      Hypertension Mother      C.A.D. Maternal Grandfather      in 80's     Alzheimer Disease Paternal Grandmother      Anxiety Disorder Father      Substance Abuse Father            Reviewed and updated as needed this visit by clinical staff  Tobacco  Allergies  Meds  Problems  Med Hx  Surg Hx  Fam Hx  Soc Hx        Reviewed and updated as needed this visit by Provider  Tobacco  Allergies  Meds  Problems  Surg Hx  Fam Hx  Soc Hx        ROS:  Constitutional, HEENT, cardiovascular, pulmonary, GI, , musculoskeletal, neuro, skin, endocrine and psych systems are negative, except as otherwise noted.    OBJECTIVE:     /74  Pulse 76  Temp 98.7  F (37.1  C) (Oral)  Wt 207 lb 14.4 oz (94.3 kg)  LMP 07/19/2018  BMI 33.56 kg/m2  Body mass index is 33.56 kg/(m^2).  GENERAL: healthy, alert and no distress  HENT: ear canals and TM's normal, nose and mouth without ulcers or lesions  NECK: no adenopathy, no asymmetry, masses, or scars and thyroid normal to palpation  RESP: lungs clear to auscultation - no rales, rhonchi or wheezes  CV: regular rate and rhythm, normal S1 S2, no S3 or S4, no murmur, click or rub, no peripheral edema and peripheral pulses strong  ABDOMEN: soft, nontender, no hepatosplenomegaly, no masses and bowel sounds normal  MS: no gross musculoskeletal defects noted, no edema  BACK: no CVA tenderness, no paralumbar tenderness  PSYCH: mentation appears normal, affect normal/bright    Diagnostic Test Results:  BMP, CBC, ESR, CRP, giardia and c diff - ordered and pending    ASSESSMENT/PLAN:       1. Diarrhea, unspecified type  Enteric panel from last visit negative for usual viral causes. Length of diarrhea and severity concerning for community acquired c diff. In the setting of patient's recent camping trip and swimming in lakes/river, testing for giardia is warranted. Will obtain BMP and CBC to assess hydration  status as well as add to infectious differential support if leukocytosis. Length of diarrhea somewhat concerning for IBD, however, no abdominal pain - will obtain ESR and CRP.  - Giardia antigen; Future  - Clostridium difficile Toxin B PCR; Future  - ESR: Erythrocyte sedimentation rate  - Basic metabolic panel  (Ca, Cl, CO2, Creat, Gluc, K, Na, BUN)  - CBC with platelets  - CRP, inflammation  - if diarrhea has not resolved by next week (4 weeks of symptoms), would likely benefit from referral to GI  - will hold off on abdominal imaging for now    2. Mild single current episode of major depressive disorder (H)  Currently on zoloft 50 mg daily which had been working until diarrhea started as above. Feels mood is slipping. Could be physical illness contributing to feeling unwell.   - consider doubling zoloft dose next week if patient feels depression symptoms are not improved       See Patient Instructions for follow up    Riya Knowles MD  Internal Medicine & Pediatrics PGY2  [facility]    Attestation:    I have reviewed the documentation from Dr. Knowles and discussed the findings with Dr. Knowles. I agree with the documentation of Dr. Knowles.  Cristiane Jimenez MD  Internal Medicine/Pediatrics  [facility]    Addendum:    Lab +giardia - prescription metronidazole 500mg BID x 5 days sent to pharmacy.    Cristiane Jimenez MD  Internal Medicine/Pediatrics  [facility]

## 2018-08-03 LAB
G LAMBLIA AG STL QL IA: ABNORMAL
SPECIMEN SOURCE: ABNORMAL

## 2018-08-03 RX ORDER — METRONIDAZOLE 500 MG/1
500 TABLET ORAL 2 TIMES DAILY
Qty: 10 TABLET | Refills: 0 | Status: SHIPPED | OUTPATIENT
Start: 2018-08-03 | End: 2018-08-08

## 2018-08-03 ASSESSMENT — PATIENT HEALTH QUESTIONNAIRE - PHQ9: SUM OF ALL RESPONSES TO PHQ QUESTIONS 1-9: 7

## 2018-08-19 ENCOUNTER — OFFICE VISIT (OUTPATIENT)
Dept: URGENT CARE | Facility: URGENT CARE | Age: 28
End: 2018-08-19
Payer: COMMERCIAL

## 2018-08-19 VITALS
BODY MASS INDEX: 32.95 KG/M2 | HEART RATE: 90 BPM | TEMPERATURE: 98.5 F | WEIGHT: 205 LBS | RESPIRATION RATE: 15 BRPM | HEIGHT: 66 IN | SYSTOLIC BLOOD PRESSURE: 98 MMHG | DIASTOLIC BLOOD PRESSURE: 70 MMHG

## 2018-08-19 DIAGNOSIS — B37.0 THRUSH: Primary | ICD-10-CM

## 2018-08-19 PROCEDURE — 99213 OFFICE O/P EST LOW 20 MIN: CPT | Performed by: PHYSICIAN ASSISTANT

## 2018-08-19 RX ORDER — CLOTRIMAZOLE 10 MG/1
1 LOZENGE ORAL
Qty: 70 TROCHE | Refills: 0 | Status: SHIPPED | OUTPATIENT
Start: 2018-08-19 | End: 2018-10-01

## 2018-08-19 NOTE — MR AVS SNAPSHOT
After Visit Summary   8/19/2018    Afia Cook    MRN: [id number]           Patient Information     Date Of Birth          1990        Visit Information        Provider Department      8/19/2018 10:35 AM Catrina Limon PA-C [facility]        Today's Diagnoses     Thrush    -  1      Care Instructions      Candida Infection: Thrush  Thrush is a fungal infection in the mouth and throat. Thrush does not usually affect healthy adults. It is more common in people with a weak immune system. It is also more likely if you take antibiotics. Thrush is normally not contagious.  Understanding fungus in the mouth and throat  Your mouth and throat normally contain millions of tiny organisms. These include bacteria and yeasts. Many of these do not cause any problems. In fact, they may help fight disease.  Yeasts are a type of fungus. A type of yeast called Candida normally lives on the membranes of your mouth and throat. Usually, this yeast grows only in small amounts and is harmless. But in some cases, Candida can grow out of control and cause thrush. Thrush is related to other kinds of Candida infections that can grow all over the body. Thrush refers to an infection of only the mouth and throat.  What causes thrush?  Thrush happens when something lets too much Candida grow inside your mouth and throat. Certain things that change the normal balance of organisms in the mouth can lead to thrush. One example is antibiotic medicine. This medicine may kill some of the normal bacteria in your mouth. Candida can then grow freely. People on antibiotics have an increased risk for thrush.  You have a higher risk for thrush if you:    Wear dentures    Are getting chemotherapy    Are getting radiation therapy    Have diabetes    Have a transplanted organ    Use corticosteroids, including inhaled corticosteroids for lung disease    Have a weak immune system, such as from AIDS    Are an  older adult  Symptoms of thrush  Symptoms of thrush can include:    A dry, cottony feeling in your mouth    Cracking at the corners of the mouth    Loss of taste    Pain while eating or swallowing    White patches on the tongue and around the sides of the mouth  Diagnosing thrush  Your healthcare provider will ask about your medical history and your symptoms. He or she will look closely at your mouth and throat. White or red patches will be scraped with a tongue depressor. The sample will be sent to a lab to test. This test can usually confirm thrush.  If you have thrush, you may also have esophageal candidiasis. This is common in people who have HIV or a weak immune system. Your healthcare provider may check for this condition with an upper endoscopy. This is a procedure to look at the esophagus. A tissue sample may be taken to test.  Treatment for thrush  Thrush is usually treated with antifungal medicine. The medicine is put directly in your mouth and throat. You may be given a  swish and swallow  medicine or an antifungal lozenge.  In some cases, you may need an antifungal pill. This can remove Candida throughout your body. Or you may need medicine through an intravenous line ( IV). These treatments depend on how severe your infection is, and what other health conditions you have.  If you are at high risk for thrush, you may need to keep taking oral antifungal medicine. This is to help prevent thrush in the future.  What happens if you don t get treated for thrush?  If untreated, the Candida may spread throughout your body. They may even enter your bloodstream. This can cause serious problems, such as organ failure and even death. Bloodstream infection may need to be treated with high doses of antifungal medicine through an IV.  Systemic infection is much more likely in people who are very ill. It is also more common in those who have serious problems with their immune system. Additional risk factors for  systemic infection in very ill people include:    Central venous lines    IV nutrition    Use of broad-spectrum antibiotics    Kidney failure    Recent surgery  Preventing thrush  You may be able to help prevent some cases of thrush. Make sure to:    Practice good oral hygiene. Try using a chlorhexidine mouthwash.    Clean your dentures regularly as instructed. Make sure they fit you correctly.    After using a corticosteroid inhaler, rinse out your mouth with water or mouthwash.    Do not use broad-spectrum antibiotics, if possible.    Get treated for health problems that increase your risk for thrush, such as diabetes.     When to call the healthcare provider  Call your healthcare provider right away if you have any of these:    Cottony feeling in your mouth    Loss of taste    Pain while eating or swallowing    White patches or plaques on your tongue or inside your mouth   Date Last Reviewed: 5/1/2017 2000-2017 [employer]. [address] [place] [address]. All rights reserved. This information is not intended as a substitute for professional medical care. Always follow your healthcare professional's instructions.                Follow-ups after your visit        Who to contact     If you have questions or need follow up information about today's clinic visit or your schedule please contact [facility] directly at [phone].  Normal or non-critical lab and imaging results will be communicated to you by MyChart, letter or phone within 4 business days after the clinic has received the results. If you do not hear from us within 7 days, please contact the clinic through MyChart or phone. If you have a critical or abnormal lab result, we will notify you by phone as soon as possible.  Submit refill requests through [employer]® or call your pharmacy and they will forward the refill request to us. Please allow 3 business days for your refill to be completed.           "Additional Information About Your Visit        MyChart Information     [employer] gives you secure access to your electronic health record. If you see a primary care provider, you can also send messages to your care team and make appointments. If you have questions, please call your primary care clinic.  If you do not have a primary care provider, please call [phone] and they will assist you.        Care EveryWhere ID     This is your Care EveryWhere ID. This could be used by other organizations to access your [place] medical records  [id number]        Your Vitals Were     Pulse Temperature Respirations Height Breastfeeding? BMI (Body Mass Index)    90 98.5  F (36.9  C) (Oral) 15 5' 6\" (1.676 m) No 33.09 kg/m2       Blood Pressure from Last 3 Encounters:   08/19/18 98/70   08/02/18 104/74   07/19/18 100/74    Weight from Last 3 Encounters:   08/19/18 205 lb (93 kg)   08/02/18 207 lb 14.4 oz (94.3 kg)   07/19/18 211 lb (95.7 kg)              Today, you had the following     No orders found for display         Today's Medication Changes          These changes are accurate as of 8/19/18 11:26 AM.  If you have any questions, ask your nurse or doctor.               Start taking these medicines.        Dose/Directions    clotrimazole 10 MG patricia   Used for:  Thrush   Started by:  Catrina Limon PA-C        Dose:  1 Patricia   Place 1 Patricia (10 mg) inside cheek 5 times daily   Quantity:  70 Patricia   Refills:  0            Where to get your medicines      These medications were sent to [place] [address] [place] [facility] [place], [place] - [address]  [address], [place] [address]     Phone:  [phone]     clotrimazole 10 MG patricia                Primary Care Provider Office Phone # Fax #    Vicky New [phone] [address] [place] [address]        Equal Access to Services     [facility]ELASQUE[facility]: Hadii jinny Travis, wayamileda darya, qaybta kaalmamohamud " lenin floresteofilo elaajennifer ah. [facility] [phone].    ATENCIÓN: Si patriciala carmen, tiene a almanza disposición servicios gratuitos de asistencia lingüística. Kai al [phone].    We comply with applicable federal civil rights laws and [place] laws. We do not discriminate on the basis of race, color, national origin, age, disability, sex, sexual orientation, or gender identity.            Thank you!     Thank you for choosing [facility]  for your care. Our goal is always to provide you with excellent care. Hearing back from our patients is one way we can continue to improve our services. Please take a few minutes to complete the written survey that you may receive in the mail after your visit with us. Thank you!             Your Updated Medication List - Protect others around you: Learn how to safely use, store and throw away your medicines at www.disposemymeds.org.          This list is accurate as of 8/19/18 11:26 AM.  Always use your most recent med list.                   Brand Name Dispense Instructions for use Diagnosis    cetirizine 10 MG tablet    zyrTEC    30 tablet    Take 10 mg by mouth as needed In the spring and allergies        clotrimazole 10 MG patricia     70 Patricia    Place 1 Patricia (10 mg) inside cheek 5 times daily    Thrush       MIRENA (52 MG) 20 MCG/24HR IUD   Generic drug:  levonorgestrel      1 each by Intrauterine route once        sertraline 50 MG tablet    ZOLOFT    90 tablet    Take 1 tablet (50 mg) by mouth daily    Mild single current episode of major depressive disorder (H)       triamcinolone 0.1 % cream    KENALOG    60 g    Apply to affected area BID    Eczema

## 2018-08-19 NOTE — PATIENT INSTRUCTIONS
Candida Infection: Thrush  Thrush is a fungal infection in the mouth and throat. Thrush does not usually affect healthy adults. It is more common in people with a weak immune system. It is also more likely if you take antibiotics. Thrush is normally not contagious.  Understanding fungus in the mouth and throat  Your mouth and throat normally contain millions of tiny organisms. These include bacteria and yeasts. Many of these do not cause any problems. In fact, they may help fight disease.  Yeasts are a type of fungus. A type of yeast called Candida normally lives on the membranes of your mouth and throat. Usually, this yeast grows only in small amounts and is harmless. But in some cases, Candida can grow out of control and cause thrush. Thrush is related to other kinds of Candida infections that can grow all over the body. Thrush refers to an infection of only the mouth and throat.  What causes thrush?  Thrush happens when something lets too much Candida grow inside your mouth and throat. Certain things that change the normal balance of organisms in the mouth can lead to thrush. One example is antibiotic medicine. This medicine may kill some of the normal bacteria in your mouth. Candida can then grow freely. People on antibiotics have an increased risk for thrush.  You have a higher risk for thrush if you:    Wear dentures    Are getting chemotherapy    Are getting radiation therapy    Have diabetes    Have a transplanted organ    Use corticosteroids, including inhaled corticosteroids for lung disease    Have a weak immune system, such as from AIDS    Are an older adult  Symptoms of thrush  Symptoms of thrush can include:    A dry, cottony feeling in your mouth    Cracking at the corners of the mouth    Loss of taste    Pain while eating or swallowing    White patches on the tongue and around the sides of the mouth  Diagnosing thrush  Your healthcare provider will ask about your medical history and your symptoms.  He or she will look closely at your mouth and throat. White or red patches will be scraped with a tongue depressor. The sample will be sent to a lab to test. This test can usually confirm thrush.  If you have thrush, you may also have esophageal candidiasis. This is common in people who have HIV or a weak immune system. Your healthcare provider may check for this condition with an upper endoscopy. This is a procedure to look at the esophagus. A tissue sample may be taken to test.  Treatment for thrush  Thrush is usually treated with antifungal medicine. The medicine is put directly in your mouth and throat. You may be given a  swish and swallow  medicine or an antifungal lozenge.  In some cases, you may need an antifungal pill. This can remove Candida throughout your body. Or you may need medicine through an intravenous line ( IV). These treatments depend on how severe your infection is, and what other health conditions you have.  If you are at high risk for thrush, you may need to keep taking oral antifungal medicine. This is to help prevent thrush in the future.  What happens if you don t get treated for thrush?  If untreated, the Candida may spread throughout your body. They may even enter your bloodstream. This can cause serious problems, such as organ failure and even death. Bloodstream infection may need to be treated with high doses of antifungal medicine through an IV.  Systemic infection is much more likely in people who are very ill. It is also more common in those who have serious problems with their immune system. Additional risk factors for systemic infection in very ill people include:    Central venous lines    IV nutrition    Use of broad-spectrum antibiotics    Kidney failure    Recent surgery  Preventing thrush  You may be able to help prevent some cases of thrush. Make sure to:    Practice good oral hygiene. Try using a chlorhexidine mouthwash.    Clean your dentures regularly as instructed. Make  sure they fit you correctly.    After using a corticosteroid inhaler, rinse out your mouth with water or mouthwash.    Do not use broad-spectrum antibiotics, if possible.    Get treated for health problems that increase your risk for thrush, such as diabetes.     When to call the healthcare provider  Call your healthcare provider right away if you have any of these:    Cottony feeling in your mouth    Loss of taste    Pain while eating or swallowing    White patches or plaques on your tongue or inside your mouth   Date Last Reviewed: 5/1/2017 2000-2017 [employer]. [address] [place] [address]. All rights reserved. This information is not intended as a substitute for professional medical care. Always follow your healthcare professional's instructions.

## 2018-08-19 NOTE — PROGRESS NOTES
"SUBJECTIVE:   Afia Cook is a 28 year old female presenting with a chief complaint of:  1) white adherent substance on tongue for the past 4 days.  Just finished a month of flagyl for giardia.    Denies any fevers.    Slight sore throat  2) seasonal allergies, with runny nose and sneezing.  Resolves with cetirizine.      Onset of symptoms was as above.  Course of illness is worsening.    Severity moderate  Current and Associated symptoms: as above  Treatment measures tried include none.  Predisposing factors include as above.    Past Medical History:   Diagnosis Date     Allergic rhinitis, cause unspecified      Patient Active Problem List   Diagnosis     Viral warts     CARDIOVASCULAR SCREENING; LDL GOAL LESS THAN 160     Vesicular palmoplantar eczema     Obesity     Encounter for IUD insertion     Mild single current episode of major depressive disorder (H)     Social History   Substance Use Topics     Smoking status: Current Every Day Smoker     Types: Cigars, Cigarettes     Smokeless tobacco: Never Used      Comment: smoke free home     Alcohol use Yes      Comment: Social       ROS:  CONSTITUTIONAL:NEGATIVE for fever, chills, change in weight  INTEGUMENTARY/SKIN: NEGATIVE for worrisome rashes, moles or lesions  ENT/MOUTH: as per HPI  RESP:NEGATIVE for significant cough or SOB  CV: NEGATIVE for chest pain, palpitations or peripheral edema  Review of systems negative except as stated above.    OBJECTIVE  :BP 98/70  Pulse 90  Temp 98.5  F (36.9  C) (Oral)  Resp 15  Ht 5' 6\" (1.676 m)  Wt 205 lb (93 kg)  Breastfeeding? No  BMI 33.09 kg/m2  GENERAL APPEARANCE: healthy, alert and no distress  OP: white adherent substance on tongue  NECK: supple, with mild anterior lymphadenopathy  SKIN: no suspicious lesions or rashes    (B37.0) Thrush  (primary encounter diagnosis)  Comment:   Plan: clotrimazole 10 MG particia              "

## 2018-09-28 NOTE — PROGRESS NOTES
"  SUBJECTIVE:   Afia Cook is a 28 year old female who presents to clinic today for the following health issues:      Depression and Anxiety Follow-Up    Status since last visit: Improved     Other associated symptoms:None    Complicating factors:     Significant life event: Yes     Current substance abuse: None    PHQ-9 4/30/2018 6/25/2018 8/2/2018   Total Score 19 5 7   Q9: Suicide Ideation Several days Not at all Not at all     LATISHA-7 SCORE 4/30/2018 6/25/2018   Total Score 10 1       PHQ-9  English  PHQ-9   Any Language  LATISHA-7  Suicide Assessment Five-step Evaluation and Treatment (SAFE-T)    Overall doing better, but as the seasons are changing she is noticing she is smoking more to \"get to where she wants to be\" with her mood - she is now smoking 1 pack every 2 days.      Problem list and histories reviewed & adjusted, as indicated.  Additional history: as documented        Reviewed and updated as needed this visit by clinical staff       Reviewed and updated as needed this visit by Provider         ROS:  Constitutional, HEENT, cardiovascular, pulmonary, gi and gu systems are negative, except as otherwise noted.    OBJECTIVE:     /72 (BP Location: Right arm, Cuff Size: Adult Large)  Pulse 62  Temp 98.1  F (36.7  C) (Oral)  Ht 5' 6\" (1.676 m)  Wt 209 lb (94.8 kg)  SpO2 99%  BMI 33.73 kg/m2  Body mass index is 33.73 kg/(m^2).  GENERAL APPEARANCE: healthy, alert and no distress  CV: regular rates and rhythm, normal S1 S2, no S3 or S4 and no murmur, click or rub    Diagnostic Test Results:  none     ASSESSMENT/PLAN:       1. Mild single current episode of major depressive disorder (H)  Increase to 100mg - follow up in 1-2 months with physical/pap  - sertraline (ZOLOFT) 50 MG tablet; Take 2 tablets (100 mg) by mouth daily  Dispense: 180 tablet; Refill: 1    2. Tobacco abuse  Not ready to quit - eventually would lie to - \"by the time she is 30\" - has not tried anything in the past.      Patient " Instructions   Increase sertraline to 100mg - if not feeling yourself could consider an inbetween dose like 75mg.    Follow-up in 1-2 months for physical and depression check in.      Cristiane Jimenez MD  [facility]

## 2018-10-01 ENCOUNTER — OFFICE VISIT (OUTPATIENT)
Dept: PEDIATRICS | Facility: CLINIC | Age: 28
End: 2018-10-01
Payer: COMMERCIAL

## 2018-10-01 VITALS
BODY MASS INDEX: 33.59 KG/M2 | WEIGHT: 209 LBS | HEIGHT: 66 IN | TEMPERATURE: 98.1 F | OXYGEN SATURATION: 99 % | SYSTOLIC BLOOD PRESSURE: 114 MMHG | DIASTOLIC BLOOD PRESSURE: 72 MMHG | HEART RATE: 62 BPM

## 2018-10-01 DIAGNOSIS — F32.0 MILD SINGLE CURRENT EPISODE OF MAJOR DEPRESSIVE DISORDER (H): Primary | ICD-10-CM

## 2018-10-01 DIAGNOSIS — Z72.0 TOBACCO ABUSE: ICD-10-CM

## 2018-10-01 PROCEDURE — 99214 OFFICE O/P EST MOD 30 MIN: CPT | Performed by: PEDIATRICS

## 2018-10-01 ASSESSMENT — PATIENT HEALTH QUESTIONNAIRE - PHQ9: 5. POOR APPETITE OR OVEREATING: MORE THAN HALF THE DAYS

## 2018-10-01 ASSESSMENT — ANXIETY QUESTIONNAIRES
3. WORRYING TOO MUCH ABOUT DIFFERENT THINGS: SEVERAL DAYS
2. NOT BEING ABLE TO STOP OR CONTROL WORRYING: SEVERAL DAYS
1. FEELING NERVOUS, ANXIOUS, OR ON EDGE: SEVERAL DAYS
7. FEELING AFRAID AS IF SOMETHING AWFUL MIGHT HAPPEN: SEVERAL DAYS
GAD7 TOTAL SCORE: 7
6. BECOMING EASILY ANNOYED OR IRRITABLE: SEVERAL DAYS
IF YOU CHECKED OFF ANY PROBLEMS ON THIS QUESTIONNAIRE, HOW DIFFICULT HAVE THESE PROBLEMS MADE IT FOR YOU TO DO YOUR WORK, TAKE CARE OF THINGS AT HOME, OR GET ALONG WITH OTHER PEOPLE: SOMEWHAT DIFFICULT
5. BEING SO RESTLESS THAT IT IS HARD TO SIT STILL: NOT AT ALL

## 2018-10-01 NOTE — MR AVS SNAPSHOT
After Visit Summary   10/1/2018    Afia Cook    MRN: [id number]           Patient Information     Date Of Birth          1990        Visit Information        Provider Department      10/1/2018 11:40 AM Cristiane Jimenez MD [facility] [place]        Today's Diagnoses     Mild single current episode of major depressive disorder (H)    -  1      Care Instructions    Increase sertraline to 100mg - if not feeling yourself could consider an inbetween dose like 75mg.    Follow-up in 1-2 months for physical and depression check in.          Follow-ups after your visit        Follow-up notes from your care team     Return in about 2 months (around 12/1/2018) for Physical Exam and depression check in.      Who to contact     If you have questions or need follow up information about today's clinic visit or your schedule please contact [facility] directly at [phone].  Normal or non-critical lab and imaging results will be communicated to you by MyChart, letter or phone within 4 business days after the clinic has received the results. If you do not hear from us within 7 days, please contact the clinic through [employer] or phone. If you have a critical or abnormal lab result, we will notify you by phone as soon as possible.  Submit refill requests through [employer] or call your pharmacy and they will forward the refill request to us. Please allow 3 business days for your refill to be completed.          Additional Information About Your Visit        MyChart Information     [employer] gives you secure access to your electronic health record. If you see a primary care provider, you can also send messages to your care team and make appointments. If you have questions, please call your primary care clinic.  If you do not have a primary care provider, please call [phone] and they will assist you.        Care EveryWhere ID     This is your Care EveryWhere ID. This could be used by other  "organizations to access your [facility] medical records  [id number]        Your Vitals Were     Pulse Temperature Height Pulse Oximetry BMI (Body Mass Index)       62 98.1  F (36.7  C) (Oral) 5' 6\" (1.676 m) 99% 33.73 kg/m2        Blood Pressure from Last 3 Encounters:   10/01/18 114/72   08/19/18 98/70   08/02/18 104/74    Weight from Last 3 Encounters:   10/01/18 209 lb (94.8 kg)   08/19/18 205 lb (93 kg)   08/02/18 207 lb 14.4 oz (94.3 kg)              Today, you had the following     No orders found for display         Today's Medication Changes          These changes are accurate as of 10/1/18 12:27 PM.  If you have any questions, ask your nurse or doctor.               These medicines have changed or have updated prescriptions.        Dose/Directions    * sertraline 50 MG tablet   Commonly known as:  ZOLOFT   This may have changed:  Another medication with the same name was added. Make sure you understand how and when to take each.   Used for:  Mild single current episode of major depressive disorder (H)   Changed by:  Cristiane Jimenez MD        Dose:  50 mg   Take 1 tablet (50 mg) by mouth daily   Quantity:  90 tablet   Refills:  1       * sertraline 50 MG tablet   Commonly known as:  ZOLOFT   This may have changed:  You were already taking a medication with the same name, and this prescription was added. Make sure you understand how and when to take each.   Used for:  Mild single current episode of major depressive disorder (H)   Changed by:  Cristiane Jimenez MD        Dose:  100 mg   Take 2 tablets (100 mg) by mouth daily   Quantity:  180 tablet   Refills:  1       * Notice:  This list has 2 medication(s) that are the same as other medications prescribed for you. Read the directions carefully, and ask your doctor or other care provider to review them with you.         Where to get your medicines      These medications were sent to [facility] [place], [place] - [address], [place] [address]     Phone:  [phone]     sertraline 50 MG tablet                Primary Care Provider Office Phone # Fax #    Cristiane Jimenez [phone] [address] [place] [address]        Equal Access to Services     [facility]: Hadii aad ku hadasho Soomaali, waaxda luqadaha, qaybta kaalmada adeegyada, waxay idiin hayaan adeeg tyelr la'aan . So [facility] [phone].    ATENCIÓN: Si habla español, tiene a almanza disposición servicios gratuitos de asistencia lingüística. [facility] [phone].    We comply with applicable federal civil rights laws and [place] laws. We do not discriminate on the basis of race, color, national origin, age, disability, sex, sexual orientation, or gender identity.            Thank you!     Thank you for choosing [facility]  for your care. Our goal is always to provide you with excellent care. Hearing back from our patients is one way we can continue to improve our services. Please take a few minutes to complete the written survey that you may receive in the mail after your visit with us. Thank you!             Your Updated Medication List - Protect others around you: Learn how to safely use, store and throw away your medicines at www.disposemymeds.org.          This list is accurate as of 10/1/18 12:27 PM.  Always use your most recent med list.                   Brand Name Dispense Instructions for use Diagnosis    cetirizine 10 MG tablet    zyrTEC    30 tablet    Take 10 mg by mouth as needed In the spring and allergies        MIRENA (52 MG) 20 MCG/24HR IUD   Generic drug:  levonorgestrel      1 each by Intrauterine route once        * sertraline 50 MG tablet    ZOLOFT    90 tablet    Take 1 tablet (50 mg) by mouth daily    Mild single current episode of major depressive disorder (H)       * sertraline 50 MG tablet    ZOLOFT    180 tablet    Take 2 tablets (100 mg) by mouth daily    Mild  single current episode of major depressive disorder (H)       triamcinolone 0.1 % cream    KENALOG    60 g    Apply to affected area BID    Eczema       * Notice:  This list has 2 medication(s) that are the same as other medications prescribed for you. Read the directions carefully, and ask your doctor or other care provider to review them with you.

## 2018-10-01 NOTE — PATIENT INSTRUCTIONS
Increase sertraline to 100mg - if not feeling yourself could consider an inbetween dose like 75mg.    Follow-up in 1-2 months for physical and depression check in.

## 2018-10-02 ASSESSMENT — ANXIETY QUESTIONNAIRES: GAD7 TOTAL SCORE: 7

## 2018-10-02 ASSESSMENT — PATIENT HEALTH QUESTIONNAIRE - PHQ9: SUM OF ALL RESPONSES TO PHQ QUESTIONS 1-9: 8

## 2018-11-25 ENCOUNTER — HEALTH MAINTENANCE LETTER (OUTPATIENT)
Age: 28
End: 2018-11-25

## 2018-12-20 ENCOUNTER — OFFICE VISIT (OUTPATIENT)
Dept: PEDIATRICS | Facility: CLINIC | Age: 28
End: 2018-12-20
Payer: COMMERCIAL

## 2018-12-20 VITALS
OXYGEN SATURATION: 97 % | DIASTOLIC BLOOD PRESSURE: 66 MMHG | BODY MASS INDEX: 33.27 KG/M2 | WEIGHT: 207 LBS | HEIGHT: 66 IN | SYSTOLIC BLOOD PRESSURE: 100 MMHG | TEMPERATURE: 98.8 F | HEART RATE: 72 BPM

## 2018-12-20 DIAGNOSIS — Z01.419 ENCOUNTER FOR GYNECOLOGICAL EXAMINATION WITHOUT ABNORMAL FINDING: Primary | ICD-10-CM

## 2018-12-20 DIAGNOSIS — Z11.3 SCREEN FOR STD (SEXUALLY TRANSMITTED DISEASE): ICD-10-CM

## 2018-12-20 DIAGNOSIS — Z72.0 TOBACCO ABUSE: ICD-10-CM

## 2018-12-20 DIAGNOSIS — F32.0 MILD SINGLE CURRENT EPISODE OF MAJOR DEPRESSIVE DISORDER (H): ICD-10-CM

## 2018-12-20 DIAGNOSIS — Z12.4 SCREENING FOR CERVICAL CANCER: ICD-10-CM

## 2018-12-20 PROCEDURE — 86780 TREPONEMA PALLIDUM: CPT | Performed by: PEDIATRICS

## 2018-12-20 PROCEDURE — 87591 N.GONORRHOEAE DNA AMP PROB: CPT | Performed by: PEDIATRICS

## 2018-12-20 PROCEDURE — G0145 SCR C/V CYTO,THINLAYER,RESCR: HCPCS | Performed by: PEDIATRICS

## 2018-12-20 PROCEDURE — 36415 COLL VENOUS BLD VENIPUNCTURE: CPT | Performed by: PEDIATRICS

## 2018-12-20 PROCEDURE — 87491 CHLMYD TRACH DNA AMP PROBE: CPT | Performed by: PEDIATRICS

## 2018-12-20 PROCEDURE — 87389 HIV-1 AG W/HIV-1&-2 AB AG IA: CPT | Performed by: PEDIATRICS

## 2018-12-20 PROCEDURE — 99395 PREV VISIT EST AGE 18-39: CPT | Performed by: PEDIATRICS

## 2018-12-20 ASSESSMENT — ENCOUNTER SYMPTOMS
EYE PAIN: 0
HEARTBURN: 0
DYSURIA: 0
PALPITATIONS: 0
ABDOMINAL PAIN: 0
NERVOUS/ANXIOUS: 1
NAUSEA: 0
SORE THROAT: 0
DIARRHEA: 0
HEADACHES: 1
ARTHRALGIAS: 0
CONSTIPATION: 0
HEMATURIA: 0
FEVER: 0
HEMATOCHEZIA: 0
FREQUENCY: 0
PARESTHESIAS: 0
MYALGIAS: 0
COUGH: 0
BREAST MASS: 0
JOINT SWELLING: 0
CHILLS: 0
DIZZINESS: 0
WEAKNESS: 0
SHORTNESS OF BREATH: 0

## 2018-12-20 ASSESSMENT — MIFFLIN-ST. JEOR: SCORE: 1685.7

## 2018-12-20 NOTE — PROGRESS NOTES
SUBJECTIVE:   CC: Afia Cook is an 28 year old woman who presents for preventive health visit.     Physical   Annual:     Getting at least 3 servings of Calcium per day:  Yes    Bi-annual eye exam:  Yes    Dental care twice a year:  NO    Sleep apnea or symptoms of sleep apnea:  None    Diet:  Regular (no restrictions)    Frequency of exercise:  6-7 days/week    Duration of exercise:  Greater than 60 minutes    Additional concerns today:  No    PHQ-2 Total Score: 1    Patient just got a new dog - 80# - has to walk several hours per day (but she is also stress eating at the same time -- so states she hasn't lost any weight.)    Today's PHQ-2 Score:   PHQ-2 ( 1999 [employer]) 12/20/2018   Q1: Little interest or pleasure in doing things 0   Q2: Feeling down, depressed or hopeless 1   PHQ-2 Score 1   Q1: Little interest or pleasure in doing things Not at all   Q2: Feeling down, depressed or hopeless Several days   PHQ-2 Score 1       Abuse: Current or Past(Physical, Sexual or Emotional)- No  Do you feel safe in your environment? Yes    Social History     Tobacco Use     Smoking status: Current Every Day Smoker     Types: Cigars, Cigarettes     Smokeless tobacco: Never Used     Tobacco comment: smoke free home   Substance Use Topics     Alcohol use: Yes     Comment: Social     Alcohol Use 12/20/2018   If you drink alcohol do you typically have greater than 3 drinks per day OR greater than 7 drinks per week? No       Reviewed orders with patient.  Reviewed health maintenance and updated orders accordingly - Yes      Mammogram not appropriate for this patient based on age.    Pertinent mammograms are reviewed under the imaging tab.  History of abnormal Pap smear: NO - age 21-29 PAP every 3 years recommended  PAP / HPV Latest Ref Rng & Units 9/25/2015 8/22/2012 10/18/2011   PAP - NIL NIL NIL   HPV 16 DNA NEG Negative - -   HPV 18 DNA NEG Negative - -   OTHER HR HPV NEG Negative - -     Reviewed and updated as needed this  "visit by clinical staff  Tobacco  Allergies  Meds  Med Hx  Surg Hx  Fam Hx  Soc Hx        Reviewed and updated as needed this visit by Provider            Review of Systems   Constitutional: Negative for chills and fever.   HENT: Negative for congestion, ear pain, hearing loss and sore throat.    Eyes: Negative for pain and visual disturbance.   Respiratory: Negative for cough and shortness of breath.    Cardiovascular: Negative for chest pain, palpitations and peripheral edema.   Gastrointestinal: Negative for abdominal pain, constipation, diarrhea, heartburn, hematochezia and nausea.   Breasts:  Negative for tenderness, breast mass and discharge.   Genitourinary: Negative for dysuria, frequency, genital sores, hematuria, pelvic pain, urgency, vaginal bleeding and vaginal discharge.   Musculoskeletal: Negative for arthralgias, joint swelling and myalgias.   Skin: Negative for rash.   Neurological: Positive for headaches. Negative for dizziness, weakness and paresthesias.   Psychiatric/Behavioral: Negative for mood changes. The patient is nervous/anxious.           OBJECTIVE:   /66 (BP Location: Right arm, Cuff Size: Adult Large)   Pulse 72   Temp 98.8  F (37.1  C) (Oral)   Ht 1.676 m (5' 6\")   Wt 93.9 kg (207 lb)   SpO2 97%   BMI 33.41 kg/m    Physical Exam  GENERAL: healthy, alert and no distress  EYES: Eyes grossly normal to inspection, PERRL and conjunctivae and sclerae normal  HENT: ear canals and TM's normal, nose and mouth without ulcers or lesions  NECK: no adenopathy, no asymmetry, masses, or scars and thyroid normal to palpation  RESP: lungs clear to auscultation - no rales, rhonchi or wheezes  BREAST: normal without masses, tenderness or nipple discharge and no palpable axillary masses or adenopathy  CV: regular rate and rhythm, normal S1 S2, no S3 or S4, no murmur, click or rub, no peripheral edema and peripheral pulses strong  ABDOMEN: soft, nontender, no hepatosplenomegaly, no masses " "and bowel sounds normal   (female): normal female external genitalia, normal urethral meatus, vaginal mucosa pink, moist, well rugated, and normal cervix/adnexa/uterus without masses or discharge  MS: no gross musculoskeletal defects noted, no edema  SKIN: no suspicious lesions or rashes  NEURO: Normal strength and tone, mentation intact and speech normal  PSYCH: mentation appears normal, affect normal/bright    Diagnostic Test Results:  none     ASSESSMENT/PLAN:   1. Encounter for gynecological examination without abnormal finding    2. Screen for STD (sexually transmitted disease)  - Neisseria gonorrhoeae PCR  - Chlamydia trachomatis PCR  - HIV Antigen Antibody Combo  - Treponema Abs w Reflex to RPR and Titer    3. Screening for cervical cancer  - Pap imaged thin layer screen reflex to HPV if ASCUS - recommend age 25 - 29    4. Mild single current episode of major depressive disorder (H)  Stable on zoloft - follow up in 6 months.     5. Tobacco abuse  Not ready to quit      COUNSELING:  Reviewed preventive health counseling, as reflected in patient instructions    BP Readings from Last 1 Encounters:   12/20/18 100/66     Estimated body mass index is 33.41 kg/m  as calculated from the following:    Height as of this encounter: 1.676 m (5' 6\").    Weight as of this encounter: 93.9 kg (207 lb).           reports that she has been smoking cigars and cigarettes.  she has never used smokeless tobacco.  Tobacco Cessation Action Plan: Information offered: Patient not interested at this time    Counseling Resources:  ATP IV Guidelines  Pooled Cohorts Equation Calculator  Breast Cancer Risk Calculator  FRAX Risk Assessment  ICSI Preventive Guidelines  Dietary Guidelines for Americans, 2010  USDA's MyPlate  ASA Prophylaxis  Lung CA Screening    Cristiane Jimenez MD  [facility]  "

## 2018-12-21 LAB
C TRACH DNA SPEC QL NAA+PROBE: NEGATIVE
HIV 1+2 AB+HIV1 P24 AG SERPL QL IA: NONREACTIVE
N GONORRHOEA DNA SPEC QL NAA+PROBE: NEGATIVE
SPECIMEN SOURCE: NORMAL
SPECIMEN SOURCE: NORMAL
T PALLIDUM AB SER QL: NONREACTIVE

## 2018-12-26 LAB
COPATH REPORT: NORMAL
PAP: NORMAL

## 2019-04-16 ENCOUNTER — MYC REFILL (OUTPATIENT)
Dept: PEDIATRICS | Facility: CLINIC | Age: 29
End: 2019-04-16

## 2019-04-16 DIAGNOSIS — F32.0 MILD SINGLE CURRENT EPISODE OF MAJOR DEPRESSIVE DISORDER (H): ICD-10-CM

## 2019-04-17 NOTE — TELEPHONE ENCOUNTER
"Requested Prescriptions   Pending Prescriptions Disp Refills     sertraline (ZOLOFT) 50 MG tablet 180 tablet 1     Sig: Take 2 tablets (100 mg) by mouth daily       SSRIs Protocol Failed - 4/16/2019  5:43 PM        Failed - PHQ-9 score less than 5 in past 6 months     Please review last PHQ-9 score.           Passed - Medication is active on med list        Passed - Patient is age 18 or older        Passed - No active pregnancy on record        Passed - No positive pregnancy test in last 12 months        Passed - Recent (6 mo) or future (30 days) visit within the authorizing provider's specialty     Patient had office visit in the last 6 months or has a visit in the next 30 days with authorizing provider or within the authorizing provider's specialty.  See \"Patient Info\" tab in inbasket, or \"Choose Columns\" in Meds & Orders section of the refill encounter.            Last office visit 12/20/18    Medication is being filled for 1 time refill only due to:  Need updated phq9-Sent via [employer]    Jasmyn Meadows RN        "

## 2019-05-13 ENCOUNTER — TELEPHONE (OUTPATIENT)
Dept: OTHER | Facility: CLINIC | Age: 29
End: 2019-05-13

## 2019-07-15 ENCOUNTER — OFFICE VISIT (OUTPATIENT)
Dept: OPHTHALMOLOGY | Facility: CLINIC | Age: 29
End: 2019-07-15
Payer: COMMERCIAL

## 2019-07-15 DIAGNOSIS — H52.13 MYOPIA OF BOTH EYES: Primary | ICD-10-CM

## 2019-07-15 ASSESSMENT — REFRACTION_CURRENTRX
OD_DIAMETER: 13.8
OS_DIAMETER: 13.8
OS_BASECURVE: 8.40
OD_BRAND: ALCON AIR OPTIX NIGHT AND DAY
OS_SPHERE: -1.75
OD_SPHERE: -1.75
OD_CYLINDER: SPHERE
OS_CYLINDER: SPHERE
OS_BRAND: ALCON AIR OPTIX NIGHT AND DAY
OD_BASECURVE: 8.40

## 2019-07-15 ASSESSMENT — CUP TO DISC RATIO
OD_RATIO: 0.2
OS_RATIO: 0.2

## 2019-07-15 ASSESSMENT — CONF VISUAL FIELD
METHOD: COUNTING FINGERS
OD_NORMAL: 1
OS_NORMAL: 1

## 2019-07-15 ASSESSMENT — EXTERNAL EXAM - LEFT EYE: OS_EXAM: NORMAL

## 2019-07-15 ASSESSMENT — REFRACTION_WEARINGRX
OD_CYLINDER: SPHERE
OS_CYLINDER: SPHERE
OS_SPHERE: -1.75
OD_SPHERE: -1.75

## 2019-07-15 ASSESSMENT — EXTERNAL EXAM - RIGHT EYE: OD_EXAM: NORMAL

## 2019-07-15 ASSESSMENT — TONOMETRY
OD_IOP_MMHG: 18
IOP_METHOD: ICARE
OS_IOP_MMHG: 19

## 2019-07-15 ASSESSMENT — REFRACTION_MANIFEST
OS_SPHERE: -2.00
OD_SPHERE: -2.00
OD_CYLINDER: SPHERE
OS_CYLINDER: SPHERE

## 2019-07-15 ASSESSMENT — VISUAL ACUITY
OD_CC: 20/20
METHOD: SNELLEN - LINEAR
OS_CC: 20/20
CORRECTION_TYPE: CONTACTS
OS_CC+: -

## 2019-07-15 ASSESSMENT — SLIT LAMP EXAM - LIDS
COMMENTS: NORMAL
COMMENTS: NORMAL

## 2019-07-15 NOTE — PROGRESS NOTES
Assessment/Plan  (H52.13) Myopia of both eyes  (primary encounter diagnosis)  Comment: Extended CL wearer  Plan: SRx and CL Rx updated and released. Trials of AV 1 Day Moist lenses dispensed. RTC with vision changes or prolonged adaptation to either CLs or glasses. Patient should continue removing Night and Day lenses regularly and replacing lenses monthly. Stop CL wear and RTC with new redness or eye pain.     Contact Lens Billing  V-Code:  - Soft spherical  Final Contact Lens Rx       Brand Base Curve Diameter Sphere Cylinder    Right [employer] Air Optix Night and Day 8.40 13.8 -1.75 Sphere    Left Mart[employer] Air Optix Night and Day 8.40 13.8 -1.75 Sphere    Expiration Date:  7/15/2021    Replacement:  Monthly    Wearing Schedule:  Extended wear      Final Contact Lens Rx #2       Brand Base Curve Diameter Sphere Cylinder    Right J&J 1-Day Acuvue Moist BC 8.5, D 14.2   -1.75 Sphere    Left J&J 1-Day Acuvue Moist BC 8.5, D 14.2   -1.75 Sphere    Expiration Date:  7/15/2021    Replacement:  Daily         Price per Unit: $75 fitting fee    These are for cosmetic contact lenses.    Encounter Diagnosis   Name Primary?     Myopia of both eyes Yes        Complete documentation of historical and exam elements from today's encounter can  be found in the full encounter summary report (not reduplicated in this progress  note). I personally obtained the chief complaint(s) and history of present illness. I  confirmed and edited as necessary the review of systems, past medical/surgical  history, family history, social history, and examination findings as documented by  others; and I examined the patient myself. I personally reviewed the relevant tests,  images, and reports as documented above. I formulated and edited as necessary the  assessment and plan and discussed the findings and management plan with the  patient and family.    Killian Subramanian, LEXI

## 2019-07-15 NOTE — NURSING NOTE
Chief Complaints and History of Present Illnesses   Patient presents with     Annual Eye Exam     Chief Complaint(s) and History of Present Illness(es)     Annual Eye Exam     Laterality: both eyes    Onset: years ago    Frequency: constantly    Course: stable    Associated symptoms: Negative for eye pain    Treatments tried: no treatments    Pain scale: 0/10              Comments     -1.75 [employer]ics Monthlies. Pt likes CLs and seeing well with them. Occasionally puffy lids in AM. Wearing them 24 hours/day. Good about switching them at the month. Sometimes take a break with dailies in between.    Rosa Anguiano COT 1:35 PM July 15, 2019

## 2019-07-28 ENCOUNTER — MYC REFILL (OUTPATIENT)
Dept: PEDIATRICS | Facility: CLINIC | Age: 29
End: 2019-07-28

## 2019-07-28 DIAGNOSIS — F32.0 MILD SINGLE CURRENT EPISODE OF MAJOR DEPRESSIVE DISORDER (H): ICD-10-CM

## 2019-07-28 NOTE — TELEPHONE ENCOUNTER
"Requested Prescriptions   Pending Prescriptions Disp Refills     sertraline (ZOLOFT) 50 MG tablet  Last Written Prescription Date:  04/17/2019  Last Fill Quantity: 180 tablet,  # refills: 0   Last Office Visit: 12/20/2018 Cristiane Jimenez MD   Future Office Visit:      180 tablet 0     Sig: Take 2 tablets (100 mg) by mouth daily       SSRIs Protocol Failed - 7/28/2019 12:49 AM        Failed - PHQ-9 score less than 5 in past 6 months     Please review last PHQ-9 score.   PHQ-9 SCORE 8/2/2018 10/1/2018 4/17/2019   PHQ-9 Total Score MyChart - - 5 (Mild depression)   PHQ-9 Total Score 7 8 5     LATISHA-7 SCORE 4/30/2018 6/25/2018 10/1/2018   Total Score 10 1 7             Failed - Recent (6 mo) or future (30 days) visit within the authorizing provider's specialty     Patient had office visit in the last 6 months or has a visit in the next 30 days with authorizing provider or within the authorizing provider's specialty.  See \"Patient Info\" tab in inbasket, or \"Choose Columns\" in Meds & Orders section of the refill encounter.            Passed - Medication is active on med list        Passed - Patient is age 18 or older        Passed - No active pregnancy on record        Passed - No positive pregnancy test in last 12 months          "

## 2019-07-30 NOTE — TELEPHONE ENCOUNTER
Medication is being filled for 1 time refill only due to:  Patient needs to be seen because needs recheck.   Please schedule.  Charmaine Gonzalez RN

## 2019-09-19 DIAGNOSIS — F32.0 MILD SINGLE CURRENT EPISODE OF MAJOR DEPRESSIVE DISORDER (H): ICD-10-CM

## 2019-11-06 ENCOUNTER — HEALTH MAINTENANCE LETTER (OUTPATIENT)
Age: 29
End: 2019-11-06

## 2019-11-10 ASSESSMENT — ENCOUNTER SYMPTOMS
DIZZINESS: 1
ARTHRALGIAS: 1
CHILLS: 0
HEMATURIA: 0
JOINT SWELLING: 0
FREQUENCY: 0
NERVOUS/ANXIOUS: 1
WEAKNESS: 0
PARESTHESIAS: 0
FEVER: 0
HEARTBURN: 1
SORE THROAT: 1
DYSURIA: 0
PALPITATIONS: 0
CONSTIPATION: 0
COUGH: 1
EYE PAIN: 0
HEMATOCHEZIA: 0
SHORTNESS OF BREATH: 1
HEADACHES: 1
DIARRHEA: 0
BREAST MASS: 0
NAUSEA: 0
MYALGIAS: 0
ABDOMINAL PAIN: 0

## 2019-11-10 ASSESSMENT — PATIENT HEALTH QUESTIONNAIRE - PHQ9
10. IF YOU CHECKED OFF ANY PROBLEMS, HOW DIFFICULT HAVE THESE PROBLEMS MADE IT FOR YOU TO DO YOUR WORK, TAKE CARE OF THINGS AT HOME, OR GET ALONG WITH OTHER PEOPLE: VERY DIFFICULT
SUM OF ALL RESPONSES TO PHQ QUESTIONS 1-9: 14
SUM OF ALL RESPONSES TO PHQ QUESTIONS 1-9: 14

## 2019-11-11 ASSESSMENT — PATIENT HEALTH QUESTIONNAIRE - PHQ9: SUM OF ALL RESPONSES TO PHQ QUESTIONS 1-9: 14

## 2019-11-12 ASSESSMENT — ENCOUNTER SYMPTOMS
HEMATURIA: 0
WEAKNESS: 0
CHILLS: 0
HEADACHES: 1
MYALGIAS: 0
HEARTBURN: 1
ABDOMINAL PAIN: 0
PALPITATIONS: 0
HEMATOCHEZIA: 0
CONSTIPATION: 0
DYSURIA: 0
NERVOUS/ANXIOUS: 1
COUGH: 1
PARESTHESIAS: 0
NAUSEA: 0
SORE THROAT: 1
SHORTNESS OF BREATH: 1
FEVER: 0
DIZZINESS: 1
DIARRHEA: 0
FREQUENCY: 0
BREAST MASS: 0
JOINT SWELLING: 0
ARTHRALGIAS: 1
EYE PAIN: 0

## 2019-11-12 NOTE — PROGRESS NOTES
"   SUBJECTIVE:   CC: Afia Cook is an 29 year old woman who presents for preventive health visit.     Healthy Habits:     Getting at least 3 servings of Calcium per day:  Yes    Bi-annual eye exam:  Yes    Dental care twice a year:  NO    Sleep apnea or symptoms of sleep apnea:  Daytime drowsiness    Diet:  Regular (no restrictions)    Frequency of exercise:  6-7 days/week    Duration of exercise:  Greater than 60 minutes    Taking medications regularly:  Yes    Medication side effects:  Lightheadedness    PHQ-2 Total Score: 5    Additional concerns today:  No    --patient has been having more spotting - realized her IUD is overdue to be replaced    --\"exhausted\" feels that she is sleeping well about 7 hours per night, but for the past 3 weeks with increase anxiety, worried about how well she will sleep or do at work, increased irritability.  She is currently on zoloft 100mg and sees a therapist regularly.  She is unsure of a trigger during this time, but has felt this in the past when her anxiety is getting uncontrolled.     She would like to stop smoking by age 30 - planning to start taper in [place]      Today's PHQ-2 Score:   PHQ-2 ( 1999 Pfizer) 11/10/2019   Q1: Little interest or pleasure in doing things 2   Q2: Feeling down, depressed or hopeless 3   PHQ-2 Score 5   Q1: Little interest or pleasure in doing things More than half the days   Q2: Feeling down, depressed or hopeless Nearly every day   PHQ-2 Score 5       Abuse: Current or Past(Physical, Sexual or Emotional)- No  Do you feel safe in your environment? Yes        Social History     Tobacco Use     Smoking status: Current Every Day Smoker     Types: Cigars, Cigarettes     Smokeless tobacco: Never Used     Tobacco comment: smoke free home   Substance Use Topics     Alcohol use: Yes     Frequency: 2-4 times a month     Drinks per session: 1 or 2     Binge frequency: Less than monthly     Comment: Social     If you drink alcohol do you typically have >3 " drinks per day or >7 drinks per week? No    No flowsheet data found.    Reviewed orders with patient.  Reviewed health maintenance and updated orders accordingly - Yes      Mammogram not appropriate for this patient based on age.  Answers for HPI/ROS submitted by the patient on 11/10/2019   Annual Exam:  If you checked off any problems, how difficult have these problems made it for you to do your work, take care of things at home, or get along with other people?: Very difficult  PHQ9 TOTAL SCORE: 14    Pertinent mammograms are reviewed under the imaging tab.  History of abnormal Pap smear: NO - age 21-29 PAP every 3 years recommended  PAP / HPV Latest Ref Rng & Units 12/20/2018 9/25/2015 8/22/2012   PAP - NIL NIL NIL   HPV 16 DNA NEG - Negative -   HPV 18 DNA NEG - Negative -   OTHER HR HPV NEG - Negative -     Reviewed and updated as needed this visit by clinical staff  Tobacco  Allergies  Meds         Reviewed and updated as needed this visit by Provider  Meds            Review of Systems   Constitutional: Negative for chills and fever.   HENT: Positive for ear pain and sore throat. Negative for congestion and hearing loss.    Eyes: Negative for pain and visual disturbance.   Respiratory: Positive for cough and shortness of breath.    Cardiovascular: Negative for chest pain, palpitations and peripheral edema.   Gastrointestinal: Positive for heartburn. Negative for abdominal pain, constipation, diarrhea, hematochezia and nausea.   Breasts:  Negative for tenderness, breast mass and discharge.   Genitourinary: Negative for dysuria, frequency, genital sores, hematuria, pelvic pain, urgency, vaginal bleeding and vaginal discharge.   Musculoskeletal: Positive for arthralgias. Negative for joint swelling and myalgias.   Skin: Negative for rash.   Neurological: Positive for dizziness and headaches. Negative for weakness and paresthesias.   Psychiatric/Behavioral: Positive for mood changes. The patient is  "nervous/anxious.           OBJECTIVE:   /70 (BP Location: Right arm, Patient Position: Chair, Cuff Size: Adult Large)   Pulse 76   Temp 98.9  F (37.2  C) (Oral)   Resp 16   Ht 1.676 m (5' 6\")   Wt 107 kg (235 lb 12.8 oz)   SpO2 97%   BMI 38.06 kg/m    Physical Exam  GENERAL: healthy, alert and no distress  EYES: Eyes grossly normal to inspection, PERRL and conjunctivae and sclerae normal  HENT: ear canals and TM's normal, nose and mouth without ulcers or lesions  NECK: no adenopathy, no asymmetry, masses, or scars and thyroid normal to palpation  RESP: lungs clear to auscultation - no rales, rhonchi or wheezes  BREAST: normal without masses, tenderness or nipple discharge and no palpable axillary masses or adenopathy  CV: regular rate and rhythm, normal S1 S2, no S3 or S4, no murmur, click or rub, no peripheral edema and peripheral pulses strong  ABDOMEN: soft, nontender, no hepatosplenomegaly, no masses and bowel sounds normal  MS: no gross musculoskeletal defects noted, no edema  SKIN: no suspicious lesions or rashes  NEURO: Normal strength and tone, mentation intact and speech normal  PSYCH: mentation appears normal, affect normal/bright    Diagnostic Test Results:  Labs reviewed in Epic    ASSESSMENT/PLAN:   1. Encounter for general adult medical examination with abnormal findings    2. Mild single current episode of major depressive disorder (H)  Uncontrolled - will check labs as below to see if VDD, anemia or thyroid playing a role, in the mean time increase zoloft to 150mg and follow up in 6-8 weeks - evsiit ok.  - sertraline (ZOLOFT) 100 MG tablet; Take 1.5 tablets (150 mg) by mouth daily  Dispense: 135 tablet; Refill: 0  - OFFICE/OUTPT VISIT,EST,LEVL IV    3. Tobacco abuse  Plans to start taper in Jan - will contact us if wants additional help    4. Other fatigue  Likely worsening anxiety/depression, but will check for below  - Hemoglobin A1c  - TSH with free T4 reflex  - CBC with platelets  - " "Vitamin D Deficiency  - Comprehensive metabolic panel  - OFFICE/OUTPT VISIT,STANLEY BELTRÁN IV    5. Need for 23-polyvalent pneumococcal polysaccharide vaccine - due to smoking  - PPSV23, IM/SUBQ (2+ YRS) - [id number]  - REVIEW OF HEALTH MAINTENANCE PROTOCOL ORDERS    COUNSELING:  Reviewed preventive health counseling, as reflected in patient instructions    Estimated body mass index is 38.06 kg/m  as calculated from the following:    Height as of this encounter: 1.676 m (5' 6\").    Weight as of this encounter: 107 kg (235 lb 12.8 oz).         reports that she has been smoking cigars and cigarettes. She has never used smokeless tobacco.      Counseling Resources:  ATP IV Guidelines  Pooled Cohorts Equation Calculator  Breast Cancer Risk Calculator  FRAX Risk Assessment  ICSI Preventive Guidelines  Dietary Guidelines for Americans, 2010  USDA's MyPlate  ASA Prophylaxis  Lung CA Screening    Cristiane Jimenez MD  [facility]  "

## 2019-11-13 ENCOUNTER — OFFICE VISIT (OUTPATIENT)
Dept: PEDIATRICS | Facility: CLINIC | Age: 29
End: 2019-11-13
Payer: COMMERCIAL

## 2019-11-13 VITALS
SYSTOLIC BLOOD PRESSURE: 108 MMHG | DIASTOLIC BLOOD PRESSURE: 70 MMHG | HEIGHT: 66 IN | BODY MASS INDEX: 37.9 KG/M2 | WEIGHT: 235.8 LBS | OXYGEN SATURATION: 97 % | HEART RATE: 76 BPM | RESPIRATION RATE: 16 BRPM | TEMPERATURE: 98.9 F

## 2019-11-13 DIAGNOSIS — Z00.01 ENCOUNTER FOR GENERAL ADULT MEDICAL EXAMINATION WITH ABNORMAL FINDINGS: Primary | ICD-10-CM

## 2019-11-13 DIAGNOSIS — F32.0 MILD SINGLE CURRENT EPISODE OF MAJOR DEPRESSIVE DISORDER (H): ICD-10-CM

## 2019-11-13 DIAGNOSIS — Z23 NEED FOR 23-POLYVALENT PNEUMOCOCCAL POLYSACCHARIDE VACCINE: ICD-10-CM

## 2019-11-13 DIAGNOSIS — R53.83 OTHER FATIGUE: ICD-10-CM

## 2019-11-13 DIAGNOSIS — Z72.0 TOBACCO ABUSE: ICD-10-CM

## 2019-11-13 LAB
ERYTHROCYTE [DISTWIDTH] IN BLOOD BY AUTOMATED COUNT: 12.4 % (ref 10–15)
HBA1C MFR BLD: 5.3 % (ref 0–5.6)
HCT VFR BLD AUTO: 40.3 % (ref 35–47)
HGB BLD-MCNC: 13.6 G/DL (ref 11.7–15.7)
MCH RBC QN AUTO: 29.8 PG (ref 26.5–33)
MCHC RBC AUTO-ENTMCNC: 33.7 G/DL (ref 31.5–36.5)
MCV RBC AUTO: 88 FL (ref 78–100)
PLATELET # BLD AUTO: 263 10E9/L (ref 150–450)
RBC # BLD AUTO: 4.57 10E12/L (ref 3.8–5.2)
WBC # BLD AUTO: 7.7 10E9/L (ref 4–11)

## 2019-11-13 PROCEDURE — 90732 PPSV23 VACC 2 YRS+ SUBQ/IM: CPT | Performed by: PEDIATRICS

## 2019-11-13 PROCEDURE — 90471 IMMUNIZATION ADMIN: CPT | Performed by: PEDIATRICS

## 2019-11-13 PROCEDURE — 99214 OFFICE O/P EST MOD 30 MIN: CPT | Mod: 25 | Performed by: PEDIATRICS

## 2019-11-13 PROCEDURE — 85027 COMPLETE CBC AUTOMATED: CPT | Performed by: PEDIATRICS

## 2019-11-13 PROCEDURE — 80053 COMPREHEN METABOLIC PANEL: CPT | Performed by: PEDIATRICS

## 2019-11-13 PROCEDURE — 82306 VITAMIN D 25 HYDROXY: CPT | Performed by: PEDIATRICS

## 2019-11-13 PROCEDURE — 84443 ASSAY THYROID STIM HORMONE: CPT | Performed by: PEDIATRICS

## 2019-11-13 PROCEDURE — 99395 PREV VISIT EST AGE 18-39: CPT | Mod: 25 | Performed by: PEDIATRICS

## 2019-11-13 PROCEDURE — 83036 HEMOGLOBIN GLYCOSYLATED A1C: CPT | Performed by: PEDIATRICS

## 2019-11-13 PROCEDURE — 36415 COLL VENOUS BLD VENIPUNCTURE: CPT | Performed by: PEDIATRICS

## 2019-11-13 RX ORDER — SERTRALINE HYDROCHLORIDE 100 MG/1
150 TABLET, FILM COATED ORAL DAILY
Qty: 135 TABLET | Refills: 0 | Status: SHIPPED | OUTPATIENT
Start: 2019-11-13 | End: 2020-01-21

## 2019-11-13 ASSESSMENT — MIFFLIN-ST. JEOR: SCORE: 1811.33

## 2019-11-14 LAB
ALBUMIN SERPL-MCNC: 3.7 G/DL (ref 3.4–5)
ALP SERPL-CCNC: 91 U/L (ref 40–150)
ALT SERPL W P-5'-P-CCNC: 28 U/L (ref 0–50)
ANION GAP SERPL CALCULATED.3IONS-SCNC: 7 MMOL/L (ref 3–14)
AST SERPL W P-5'-P-CCNC: 17 U/L (ref 0–45)
BILIRUB SERPL-MCNC: 0.2 MG/DL (ref 0.2–1.3)
BUN SERPL-MCNC: 15 MG/DL (ref 7–30)
CALCIUM SERPL-MCNC: 8.7 MG/DL (ref 8.5–10.1)
CHLORIDE SERPL-SCNC: 108 MMOL/L (ref 94–109)
CO2 SERPL-SCNC: 23 MMOL/L (ref 20–32)
CREAT SERPL-MCNC: 0.66 MG/DL (ref 0.52–1.04)
DEPRECATED CALCIDIOL+CALCIFEROL SERPL-MC: 43 UG/L (ref 20–75)
GFR SERPL CREATININE-BSD FRML MDRD: >90 ML/MIN/{1.73_M2}
GLUCOSE SERPL-MCNC: 99 MG/DL (ref 70–99)
POTASSIUM SERPL-SCNC: 4 MMOL/L (ref 3.4–5.3)
PROT SERPL-MCNC: 7 G/DL (ref 6.8–8.8)
SODIUM SERPL-SCNC: 138 MMOL/L (ref 133–144)
TSH SERPL DL<=0.005 MIU/L-ACNC: 1.67 MU/L (ref 0.4–4)

## 2019-12-05 ENCOUNTER — OFFICE VISIT (OUTPATIENT)
Dept: OBGYN | Facility: CLINIC | Age: 29
End: 2019-12-05
Payer: COMMERCIAL

## 2019-12-05 VITALS — WEIGHT: 227.8 LBS | DIASTOLIC BLOOD PRESSURE: 68 MMHG | BODY MASS INDEX: 36.77 KG/M2 | SYSTOLIC BLOOD PRESSURE: 116 MMHG

## 2019-12-05 DIAGNOSIS — Z30.430 ENCOUNTER FOR IUD INSERTION: ICD-10-CM

## 2019-12-05 DIAGNOSIS — Z30.430 ENCOUNTER FOR IUD INSERTION: Primary | ICD-10-CM

## 2019-12-05 DIAGNOSIS — Z30.430 ENCOUNTER FOR INSERTION OF MIRENA IUD: ICD-10-CM

## 2019-12-05 DIAGNOSIS — Z30.433 ENCOUNTER FOR REMOVAL AND REINSERTION OF INTRAUTERINE CONTRACEPTIVE DEVICE: ICD-10-CM

## 2019-12-05 LAB — HCG UR QL: NEGATIVE

## 2019-12-05 PROCEDURE — 58300 INSERT INTRAUTERINE DEVICE: CPT | Performed by: ADVANCED PRACTICE MIDWIFE

## 2019-12-05 PROCEDURE — 58301 REMOVE INTRAUTERINE DEVICE: CPT | Performed by: ADVANCED PRACTICE MIDWIFE

## 2019-12-05 PROCEDURE — 81025 URINE PREGNANCY TEST: CPT | Performed by: ADVANCED PRACTICE MIDWIFE

## 2019-12-05 NOTE — PROGRESS NOTES
SUBJECTIVE:    Is a pregnancy test required: Yes.  Was it positive or negative?  Negative  Was a consent obtained?  Yes    Subjective: Afia Cook is a 29 year old No obstetric history on file. presents for IUD and desires Mirena type IUD.  She requests removal of the IUD because the IUD effectiveness has     Patient has been given the opportunity to ask questions about all forms of birth control, including all options appropriate for Afia Cook. Discussed that no method of birth control, except abstinence is 100% effective against pregnancy or sexually transmitted infection.     Afia Cook understands she may have the IUD removed at any time. IUD should be removed by a health care provider and the current IUD will be removed today.    The entire removal and insertion procedure was reviewed with the patient, including care after placement.    Today's PHQ-2 Score:   PHQ-2 (  Pf[employer]) 11/10/2019   Q1: Little interest or pleasure in doing things 2   Q2: Feeling down, depressed or hopeless 3   PHQ-2 Score 5   Q1: Little interest or pleasure in doing things More than half the days   Q2: Feeling down, depressed or hopeless Nearly every day   PHQ-2 Score 5       PROCEDURE:    Did not premedicate.   A speculum exam was performed and the cervix was visualized. The IUD string was visualized. Using ring forceps, the string  was grasped and the IUD removed intact.    Under sterile technique, cervix was visualized with speculum and prepped with Betadine solution swab x 3. Tenaculum was placed for stability. The uterus was gently straightened and sounded to 7.0 cm. IUD prepared for placement, and IUD inserted according to 's instructions without difficulty or significant ressitance, and deployed at the fundus. The strings were visualized and trimmed to 2.5 cm from the external os. Tenaculum was removed and hemostasis noted. Speculum removed.  Patient tolerated procedure well.    Lot # [id number]  Exp:  03/2022    EBL: minimal    Complications: none      POST PROCEDURE:    Given 's handouts, including when to have IUD removed, list of danger s/sx, side effects and follow up recommended. Encouraged condom use for prevention of STD. Advised to call for any fever, for prolonged or severe pain or bleeding, abnormal vaginal dischage, or unable to palpate strings. She was advised to use pain medications (ibuprofen) as needed for mild to moderate pain. Advised to follow-up in clinic in 4-6 weeks for IUD string check if unable to find strings or as directed by provider.     Hope Price CNM

## 2019-12-05 NOTE — NURSING NOTE
"Chief Complaint   Patient presents with     IUD     Mirena replacement       Initial /68   Wt 103.3 kg (227 lb 12.8 oz)   LMP 12/03/2019   BMI 36.77 kg/m   Estimated body mass index is 36.77 kg/m  as calculated from the following:    Height as of 11/13/19: 1.676 m (5' 6\").    Weight as of this encounter: 103.3 kg (227 lb 12.8 oz).  BP completed using cuff size: large    Questioned patient about current smoking habits.  Pt. currently smokes.  Advised about smoking cessation.    The following HM Due: NONE  [employer] CMA         "

## 2020-01-20 ENCOUNTER — E-VISIT (OUTPATIENT)
Dept: PEDIATRICS | Facility: CLINIC | Age: 30
End: 2020-01-20
Payer: COMMERCIAL

## 2020-01-20 DIAGNOSIS — F32.0 MILD SINGLE CURRENT EPISODE OF MAJOR DEPRESSIVE DISORDER (H): ICD-10-CM

## 2020-01-20 PROCEDURE — 99421 OL DIG E/M SVC 5-10 MIN: CPT | Performed by: PEDIATRICS

## 2020-01-20 ASSESSMENT — ANXIETY QUESTIONNAIRES
2. NOT BEING ABLE TO STOP OR CONTROL WORRYING: NOT AT ALL
7. FEELING AFRAID AS IF SOMETHING AWFUL MIGHT HAPPEN: NOT AT ALL
3. WORRYING TOO MUCH ABOUT DIFFERENT THINGS: SEVERAL DAYS
4. TROUBLE RELAXING: NOT AT ALL
GAD7 TOTAL SCORE: 4
5. BEING SO RESTLESS THAT IT IS HARD TO SIT STILL: NOT AT ALL
7. FEELING AFRAID AS IF SOMETHING AWFUL MIGHT HAPPEN: NOT AT ALL
6. BECOMING EASILY ANNOYED OR IRRITABLE: MORE THAN HALF THE DAYS
GAD7 TOTAL SCORE: 4
GAD7 TOTAL SCORE: 4
1. FEELING NERVOUS, ANXIOUS, OR ON EDGE: SEVERAL DAYS

## 2020-01-20 ASSESSMENT — PATIENT HEALTH QUESTIONNAIRE - PHQ9
10. IF YOU CHECKED OFF ANY PROBLEMS, HOW DIFFICULT HAVE THESE PROBLEMS MADE IT FOR YOU TO DO YOUR WORK, TAKE CARE OF THINGS AT HOME, OR GET ALONG WITH OTHER PEOPLE: SOMEWHAT DIFFICULT
SUM OF ALL RESPONSES TO PHQ QUESTIONS 1-9: 6
SUM OF ALL RESPONSES TO PHQ QUESTIONS 1-9: 6

## 2020-01-21 RX ORDER — SERTRALINE HYDROCHLORIDE 100 MG/1
150 TABLET, FILM COATED ORAL DAILY
Qty: 135 TABLET | Refills: 1 | Status: SHIPPED | OUTPATIENT
Start: 2020-01-21 | End: 2020-10-28

## 2020-01-21 ASSESSMENT — ANXIETY QUESTIONNAIRES: GAD7 TOTAL SCORE: 4

## 2020-01-21 ASSESSMENT — PATIENT HEALTH QUESTIONNAIRE - PHQ9: SUM OF ALL RESPONSES TO PHQ QUESTIONS 1-9: 6

## 2020-09-11 ENCOUNTER — TELEPHONE (OUTPATIENT)
Dept: PEDIATRICS | Facility: CLINIC | Age: 30
End: 2020-09-11

## 2020-09-11 NOTE — TELEPHONE ENCOUNTER
Patient Quality Outreach      Summary:    Patient is due/failing the following:   PHQ-9 Needed    Type of outreach:    [employer] message.    Questions for provider review:    None                                                                                   **Start Working phrase here:**       Patient has the following on her problem list/HM:     Depression / Dysthymia review    6 Month Remission: 4-8 month window range:   12 Month Remission: 10-14 month window range:        PHQ-9 SCORE 11/10/2019 11/10/2019 1/20/2020   PHQ-9 Total Score MyChart 14 (Moderate depression) 13 (Moderate depression) 6 (Mild depression)   PHQ-9 Total Score 13 14 6       If PHQ-9 recheck is 5 or more, route to provider for next steps.                                                Candida Celaya [facility]      Chart routed to Care Team.

## 2020-10-27 DIAGNOSIS — F32.0 MILD SINGLE CURRENT EPISODE OF MAJOR DEPRESSIVE DISORDER (H): ICD-10-CM

## 2020-10-27 NOTE — TELEPHONE ENCOUNTER
Routing refill request to provider for review/approval because:  Labs out of range:  PHQ, LATISHA  Patient needs to be seen because:  Patient due for visit    PHQ 11/10/2019 1/20/2020 10/10/2020   PHQ-9 Total Score 14 6 7   Q9: Thoughts of better off dead/self-harm past 2 weeks Not at all Not at all Not at all     LATISHA-7 SCORE 10/1/2018 1/20/2020 10/10/2020   Total Score - 4 (minimal anxiety) 14 (moderate anxiety)   Total Score 7 4 14       Bushra BETANCOURT, RN, BSN

## 2020-10-28 RX ORDER — SERTRALINE HYDROCHLORIDE 100 MG/1
TABLET, FILM COATED ORAL
Qty: 45 TABLET | Refills: 0 | Status: SHIPPED | OUTPATIENT
Start: 2020-10-28 | End: 2020-11-23

## 2020-10-28 NOTE — TELEPHONE ENCOUNTER
Left voicemail for patient to return our call. She is due for a follow up and she will be due for a physical sometime after 11/13/20.

## 2020-10-28 NOTE — TELEPHONE ENCOUNTER
Refilled x 1 month  -overdue for follow up and due for physical.    Recommend physical PLUS - with any available provider.    Cristiane Jimenez MD  Internal Medicine/Pediatrics  [facility]

## 2020-11-13 NOTE — TELEPHONE ENCOUNTER
Patient has appointment with PCP scheduled for 11/23/2020 at 1500.    Closing encounter.    Orlando Kent at 12:49 PM on 11/13/2020  [facility] Health Guide  Phone [phone]

## 2020-11-23 ENCOUNTER — TELEPHONE (OUTPATIENT)
Dept: PEDIATRICS | Facility: CLINIC | Age: 30
End: 2020-11-23

## 2020-11-23 ENCOUNTER — VIRTUAL VISIT (OUTPATIENT)
Dept: PEDIATRICS | Facility: CLINIC | Age: 30
End: 2020-11-23
Payer: COMMERCIAL

## 2020-11-23 DIAGNOSIS — Z72.0 TOBACCO ABUSE: ICD-10-CM

## 2020-11-23 DIAGNOSIS — F32.0 MILD SINGLE CURRENT EPISODE OF MAJOR DEPRESSIVE DISORDER (H): Primary | ICD-10-CM

## 2020-11-23 PROCEDURE — 99213 OFFICE O/P EST LOW 20 MIN: CPT | Mod: 95 | Performed by: PEDIATRICS

## 2020-11-23 RX ORDER — SERTRALINE HYDROCHLORIDE 100 MG/1
TABLET, FILM COATED ORAL
Qty: 135 TABLET | Refills: 1 | Status: SHIPPED | OUTPATIENT
Start: 2020-11-23 | End: 2021-06-21

## 2020-11-23 NOTE — LETTER
February 24, 2021      Afia Cook  [address]   [place] [address]        Dear Afia,       We care about your health and have reviewed your health plan including your medical conditions, medications, and lab results.  Based on this review, it is recommended that you follow up regarding the following health topic(s):  -Depression  -Wellness (Physical) Visit     We recommend you take the following action(s):  -schedule a FOLLOWUP APPOINTMENT.  -schedule a WELLNESS (Physical) APPOINTMENT.  We will perform the following labs: Lipids (fasting cholesterol - nothing to eat except water and/or meds for 8-10 hours).     Please call us at the [facility] - [phone] (or use [employer]) to address the above recommendations.     Thank you for trusting [facility] and we appreciate the opportunity to serve you.  We look forward to supporting your healthcare needs in the future.    Healthy Regards,    Your Health Care Team  [facility]

## 2020-11-23 NOTE — TELEPHONE ENCOUNTER
[facility] call to assist with physical plus in 6 months. No answer, LVM asking to call back on [facility] direct extension.    Orlando Kent at 3:27 PM on 11/23/2020  [facility] Health Guide  Phone [phone]

## 2020-11-23 NOTE — PATIENT INSTRUCTIONS
Dear Afia,    It was nice to see you today.  I hope the last few weeks of calm become the no norm. However if you feel that you want to try a higher dose of the sertraline just let me know.  Otherwise we will plan to see you in 6 months.     Cristiane Jimenez MD  Internal Medicine/Pediatrics  [facility]

## 2020-11-23 NOTE — PROGRESS NOTES
"Afia Cook is a 30 year old female who is being evaluated via a billable video visit.      The patient has been notified of following:     \"This video visit will be conducted via a call between you and your physician/provider. We have found that certain health care needs can be provided without the need for an in-person physical exam.  This service lets us provide the care you need with a video conversation.  If a prescription is necessary we can send it directly to your pharmacy.  If lab work is needed we can place an order for that and you can then stop by our lab to have the test done at a later time.    Video visits are billed at different rates depending on your insurance coverage.  Please reach out to your insurance provider with any questions.    If during the course of the call the physician/provider feels a video visit is not appropriate, you will not be charged for this service.\"    Patient has given verbal consent for Video visit? Yes  How would you like to obtain your AVS? MyChart  If you are dropped from the video visit, the video invite should be resent to: Send to e-mail at: [email or web address]  Will anyone else be joining your video visit? No    Subjective     Afia Cook is a 30 year old female who presents today via video visit for the following health issues:    HPI     Depression Followup    How are you doing with your depression since your last visit? No change    Are you having other symptoms that might be associated with depression? No    Have you had a significant life event?  OTHER: started grad school     Are you feeling anxious or having panic attacks?   Yes:  grad school and COVID    Do you have any concerns with your use of alcohol or other drugs? No    Started at [employer] in [place] - all online - doing marriage therapy program - feels very good about this. Still working full time at [employer]. Stress has been high - she works with excellent people and they have helped her rearrange " some things. The past two weeks she has felt more centered.     Still seeing therapist weekly. Wondering if her medications are a little off. THerapist noted she has more of a struggle to focus - bouncing around more.     Taking zyrtec 1/3 tablet every few days - used to take for fall allergies, but got very itchy after she stopped the zyrtec. It is doing better with weaning down.     Sleep - became noctural this summer - feels this is her natural cycle and felt great. Hard to come back to normal schedule. Sleeping at various times during the day.       Social History     Tobacco Use     Smoking status: Current Every Day Smoker     Types: Cigars, Cigarettes     Smokeless tobacco: Never Used     Tobacco comment: smoke free home   Substance Use Topics     Alcohol use: Yes     Frequency: 2-4 times a month     Drinks per session: 1 or 2     Binge frequency: Less than monthly     Comment: Social     Drug use: No     PHQ 11/10/2019 1/20/2020 10/10/2020   PHQ-9 Total Score 14 6 7   Q9: Thoughts of better off dead/self-harm past 2 weeks Not at all Not at all Not at all     LATISHA-7 SCORE 10/1/2018 1/20/2020 10/10/2020   Total Score - 4 (minimal anxiety) 14 (moderate anxiety)   Total Score 7 4 14     Last PHQ-9 10/10/2020   1.  Little interest or pleasure in doing things 0   2.  Feeling down, depressed, or hopeless 1   3.  Trouble falling or staying asleep, or sleeping too much 2   4.  Feeling tired or having little energy 2   5.  Poor appetite or overeating 0   6.  Feeling bad about yourself 1   7.  Trouble concentrating 1   8.  Moving slowly or restless 0   Q9: Thoughts of better off dead/self-harm past 2 weeks 0   PHQ-9 Total Score 7   Difficulty at work, home, or with people -     LATISHA-7  10/10/2020   1. Feeling nervous, anxious, or on edge 3   2. Not being able to stop or control worrying 2   3. Worrying too much about different things 2   4. Trouble relaxing 2   5. Being so restless that it is hard to sit still 0   6.  Becoming easily annoyed or irritable 3   7. Feeling afraid, as if something awful might happen 2   LATISHA-7 Total Score 14   If you checked any problems, how difficult have they made it for you to do your work, take care of things at home, or get along with other people? -         Suicide Assessment Five-step Evaluation and Treatment (SAFE-T)      How many servings of fruits and vegetables do you eat daily?  2-3    On average, how many sweetened beverages do you drink each day (Examples: soda, juice, sweet tea, etc.  Do NOT count diet or artificially sweetened beverages)?   1    How many days per week do you exercise enough to make your heart beat faster? 3 or less    How many minutes a day do you exercise enough to make your heart beat faster? 60 or more  How many days per week do you miss taking your medication? 1    What makes it hard for you to take your medications?  schedule changes         Video Start Time: 3:00pm    Review of Systems   Constitutional, HEENT, cardiovascular, pulmonary, gi and gu systems are negative, except as otherwise noted.      Objective           Vitals:  No vitals were obtained today due to virtual visit.    Physical Exam     GENERAL: Healthy, alert and no distress  EYES: Eyes grossly normal to inspection.  No discharge or erythema, or obvious scleral/conjunctival abnormalities.  RESP: No audible wheeze, cough, or visible cyanosis.  No visible retractions or increased work of breathing.    SKIN: Visible skin clear. No significant rash, abnormal pigmentation or lesions.  NEURO: Cranial nerves grossly intact.  Mentation and speech appropriate for age.  PSYCH: Mentation appears normal, affect normal/bright, judgement and insight intact, normal speech and appearance well-groomed.              Assessment & Plan     Mild single current episode of major depressive disorder (H)  Controlled - however discussed that if stresses increase again over the next few weeks, plan to increase sertraline to  200mg. Sleep issues also noted - if in the future we need to adjust medications, may consider trazodone to help with sleep and mood.   - sertraline (ZOLOFT) 100 MG tablet; TAKE 1.5 TABLETS BY MOUTH DAILY    Tobacco abuse  Not ready to quit       Tobacco Cessation:   reports that she has been smoking cigarettes. She has never used smokeless tobacco.  Tobacco Cessation Action Plan: Information offered: Patient not interested at this time         Patient Instructions   Dear Afia,    It was nice to see you today.  I hope the last few weeks of calm become the no norm. However if you feel that you want to try a higher dose of the sertraline just let me know.  Otherwise we will plan to see you in 6 months.     Cristiane Jimenez MD  Internal Medicine/Pediatrics  [facility]          Return in about 6 months (around 5/23/2021) for Routine preventive, with me.    Cristiane Jimenez MD  [facility]      Video-Visit Details    Type of service:  Video Visit    Video End Time:3:12pm    Originating Location (pt. Location): Home    Distant Location (provider location):  [facility]     Platform used for Video Visit: [employer]

## 2021-01-15 ENCOUNTER — HEALTH MAINTENANCE LETTER (OUTPATIENT)
Age: 31
End: 2021-01-15

## 2021-02-09 NOTE — TELEPHONE ENCOUNTER
CHG call to assist with scheduling physical. No answer, LVM informing patient CHG was calling to assist with scheduling physical.    If patient calls back assist with scheduling physical for around 06/23/2021.    Attempt #2    Orlando Kent at 9:32 AM on 2/9/2021  [facility] Guide  Phone [phone]

## 2021-02-24 NOTE — TELEPHONE ENCOUNTER
CHG call to assist with scheduling physical. No answer, LVM informing patient CHG was calling to assist with scheduling physical.     If patient calls back assist with scheduling physical for around 06/23/2021.     Attempt #3    CHG sending letter informing patient they are overdue for physical.    Closing encounter.    Orlando Kent at 4:21 PM on 2/24/2021  [facility] Guide  Phone [phone]

## 2021-03-01 ENCOUNTER — TELEPHONE (OUTPATIENT)
Dept: PEDIATRICS | Facility: CLINIC | Age: 31
End: 2021-03-01

## 2021-03-01 NOTE — TELEPHONE ENCOUNTER
Patient called at approximally 0530 and left voicemail for CHG. In message patient verbalized being uncomfortable coming into clinic during COVID pandemic and says they are holding off on preventative care until pandemic is over. CHG will inform provider.    Orlando Kent at 9:49 AM on 3/1/2021  [facility] Health Guide  Phone [phone]

## 2021-03-05 NOTE — TELEPHONE ENCOUNTER
CHG call to assist with scheduling virtual visit. No answer, LVM asking to call back on CHG direct extension.    Orlando Kent at 1:47 PM on 3/5/2021  [facility] Health Guide  Phone [phone]

## 2021-03-09 NOTE — TELEPHONE ENCOUNTER
CHG call to assist with scheduling virtual visit. No answer, LVM asking to call back on CHG direct extension.    Attempt #2    Orlando Kent at 11:05 AM on 3/9/2021  [facility] Health Guide  Phone [phone]

## 2021-03-17 NOTE — TELEPHONE ENCOUNTER
CHG call to assist with scheduling. Video visit for depression follow up scheduled for 05/17/2021 at 1515.    Patient is still uncomfortable coming into clinic during COVID and wants to wait until after getting COVID vaccine before coming into clinic. Patient says she will call once she gets the vaccine for assistance with scheduling.    Orlando Kent at 12:03 PM on 3/17/2021  [facility] Health Guide  Phone [phone]

## 2021-05-17 ENCOUNTER — VIRTUAL VISIT (OUTPATIENT)
Dept: PEDIATRICS | Facility: CLINIC | Age: 31
End: 2021-05-17
Payer: COMMERCIAL

## 2021-05-17 DIAGNOSIS — F32.0 MILD SINGLE CURRENT EPISODE OF MAJOR DEPRESSIVE DISORDER (H): ICD-10-CM

## 2021-05-17 DIAGNOSIS — G47.09 OTHER INSOMNIA: Primary | ICD-10-CM

## 2021-05-17 PROCEDURE — 99214 OFFICE O/P EST MOD 30 MIN: CPT | Mod: 95 | Performed by: PEDIATRICS

## 2021-05-17 RX ORDER — TRAZODONE HYDROCHLORIDE 50 MG/1
50 TABLET, FILM COATED ORAL AT BEDTIME
Qty: 90 TABLET | Refills: 0 | Status: SHIPPED | OUTPATIENT
Start: 2021-05-17 | End: 2021-06-21

## 2021-05-17 ASSESSMENT — PATIENT HEALTH QUESTIONNAIRE - PHQ9
SUM OF ALL RESPONSES TO PHQ QUESTIONS 1-9: 15
5. POOR APPETITE OR OVEREATING: SEVERAL DAYS

## 2021-05-17 ASSESSMENT — ANXIETY QUESTIONNAIRES
GAD7 TOTAL SCORE: 8
6. BECOMING EASILY ANNOYED OR IRRITABLE: SEVERAL DAYS
7. FEELING AFRAID AS IF SOMETHING AWFUL MIGHT HAPPEN: SEVERAL DAYS
5. BEING SO RESTLESS THAT IT IS HARD TO SIT STILL: NOT AT ALL
1. FEELING NERVOUS, ANXIOUS, OR ON EDGE: MORE THAN HALF THE DAYS
IF YOU CHECKED OFF ANY PROBLEMS ON THIS QUESTIONNAIRE, HOW DIFFICULT HAVE THESE PROBLEMS MADE IT FOR YOU TO DO YOUR WORK, TAKE CARE OF THINGS AT HOME, OR GET ALONG WITH OTHER PEOPLE: SOMEWHAT DIFFICULT
3. WORRYING TOO MUCH ABOUT DIFFERENT THINGS: SEVERAL DAYS
2. NOT BEING ABLE TO STOP OR CONTROL WORRYING: MORE THAN HALF THE DAYS

## 2021-05-17 NOTE — PROGRESS NOTES
Afia is a 30 year old who is being evaluated via a billable video visit.      How would you like to obtain your AVS? MyChart  If the video visit is dropped, the invitation should be resent by: Text to cell phone: [phone]  Will anyone else be joining your video visit? No    Video Start Time: 3:02    Assessment & Plan     Mild single current episode of major depressive disorder (H)  Uncontrolled - very overwhlemed right now.  Discussed increasing sertraline to 200mg, but patient has some concern about being at max dose.  Vs - addressing sleep as below.    Other insomnia  Will try trazodone for both sleep and depression - start at 50mg, follow up in 4 weeks, consider increasing to 100mg at two weeks before follow up with me.  - traZODone (DESYREL) 50 MG tablet; Take 1 tablet (50 mg) by mouth At Bedtime             Patient Instructions   Hi Afia,    It was nice to see you today.    Let's work on sleep!    Please start trazodone 50mg at bedtime.  If this isn't helping and not causing any side effects, ok to increase to 100mg after two weeks before I see you again.    We will follow up in 4 weeks with a video visit.    Cristiane Jimenez MD  Internal Medicine/Pediatrics  [facility]          Return in about 4 weeks (around 6/14/2021) for Follow up, with me, using a video visit, Depression/Anxiety.    Cristiane Jimenez MD  [facility]    Subjective   Afia is a 30 year old who presents for the following health issues  accompanied by her self    HPI     Depression Followup    How are you doing with your depression since your last visit? Worsened     Are you having other symptoms that might be associated with depression? Yes:  sleep issues     Have you had a significant life event?  OTHER: grad school  Just moved and became estranged from Father in 12/2020.     Are you feeling anxious or having panic attacks?   Yes:  overwhelmed     Do you have any concerns with your use of alcohol or other  "drugs? No    Patient currently in grad school for marriage therapy.  Very intense school - enjoyable, but having to deal with her \"own stuff.\"  Hard to work full time and do school full time. Has 2-3 more years of grad school.  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 Currently taking sertraline 150mg daily. Patient is \"on a night cycle\".  Sleeping is a problem. Got three hours of sleep last night 7am to 10am. Was taking 5 hour naps every 12 hours.     Her therapist has indicated that she may need med adjustments.  \"not hitting happy emotions as much\".     Last VV 11/23/2020: had noted more struggles to focus at that time. Stayed at 150mg at that time, but had discussed increasing to 200mg. Since that time:     Due for prevent with PAP this December 2021.    Social History     Tobacco Use     Smoking status: Current Every Day Smoker     Types: Cigarettes     Smokeless tobacco: Never Used     Tobacco comment: smoke free home   Substance Use Topics     Alcohol use: Yes     Frequency: 2-4 times a month     Drinks per session: 1 or 2     Binge frequency: Less than monthly     Comment: Social     Drug use: No     PHQ 1/20/2020 10/10/2020 5/17/2021   PHQ-9 Total Score 6 7 15   Q9: Thoughts of better off dead/self-harm past 2 weeks Not at all Not at all Not at all     LATISHA-7 SCORE 1/20/2020 10/10/2020 5/17/2021   Total Score 4 (minimal anxiety) 14 (moderate anxiety) -   Total Score 4 14 8     Last PHQ-9 5/17/2021   1.  Little interest or pleasure in doing things 2   2.  Feeling down, depressed, or hopeless 1   3.  Trouble falling or staying asleep, or sleeping too much 3   4.  Feeling tired or having little energy 3   5.  Poor appetite or overeating 2   6.  Feeling bad about yourself 2   7.  Trouble concentrating 2   8.  Moving slowly or restless 0   Q9: Thoughts of better off dead/self-harm past 2 weeks 0   PHQ-9 Total Score 15   Difficulty at work, home, or with people Extremely dIfficult     LATISHA-7  5/17/2021   1. Feeling " nervous, anxious, or on edge 2   2. Not being able to stop or control worrying 2   3. Worrying too much about different things 1   4. Trouble relaxing 1   5. Being so restless that it is hard to sit still 0   6. Becoming easily annoyed or irritable 1   7. Feeling afraid, as if something awful might happen 1   LATISHA-7 Total Score 8   If you checked any problems, how difficult have they made it for you to do your work, take care of things at home, or get along with other people? Somewhat difficult       Suicide Assessment Five-step Evaluation and Treatment (SAFE-T)      How many servings of fruits and vegetables do you eat daily?  0-1    On average, how many sweetened beverages do you drink each day (Examples: soda, juice, sweet tea, etc.  Do NOT count diet or artificially sweetened beverages)?   1    How many days per week do you exercise enough to make your heart beat faster? 7    How many minutes a day do you exercise enough to make your heart beat faster? 30 - 60    How many days per week do you miss taking your medication? 0        Review of Systems   Constitutional, HEENT, cardiovascular, pulmonary, gi and gu systems are negative, except as otherwise noted.      Objective           Vitals:  No vitals were obtained today due to virtual visit.    Physical Exam   GENERAL: Healthy, alert and no distress  EYES: Eyes grossly normal to inspection.  No discharge or erythema, or obvious scleral/conjunctival abnormalities.  RESP: No audible wheeze, cough, or visible cyanosis.  No visible retractions or increased work of breathing.    SKIN: Visible skin clear. No significant rash, abnormal pigmentation or lesions.  NEURO: Cranial nerves grossly intact.  Mentation and speech appropriate for age.  PSYCH: Mentation appears normal, affect normal/bright, judgement and insight intact, normal speech and appearance well-groomed.                Video-Visit Details    Type of service:  Video Visit    Video End  Time:3:17pm    Originating Location (pt. Location): Home    Distant Location (provider location):  [facility]     Platform used for Video Visit: [employer]

## 2021-05-17 NOTE — PATIENT INSTRUCTIONS
Vega Oreilly,    It was nice to see you today.    Let's work on sleep!    Please start trazodone 50mg at bedtime.  If this isn't helping and not causing any side effects, ok to increase to 100mg after two weeks before I see you again.    We will follow up in 4 weeks with a video visit.    Cristiane Jimenez MD  Internal Medicine/Pediatrics  [facility]

## 2021-05-18 ASSESSMENT — ANXIETY QUESTIONNAIRES: GAD7 TOTAL SCORE: 8

## 2021-06-21 ENCOUNTER — VIRTUAL VISIT (OUTPATIENT)
Dept: PEDIATRICS | Facility: CLINIC | Age: 31
End: 2021-06-21
Payer: COMMERCIAL

## 2021-06-21 DIAGNOSIS — F32.0 MILD SINGLE CURRENT EPISODE OF MAJOR DEPRESSIVE DISORDER (H): Primary | ICD-10-CM

## 2021-06-21 DIAGNOSIS — G47.09 OTHER INSOMNIA: ICD-10-CM

## 2021-06-21 PROCEDURE — 96127 BRIEF EMOTIONAL/BEHAV ASSMT: CPT | Performed by: PEDIATRICS

## 2021-06-21 PROCEDURE — 99213 OFFICE O/P EST LOW 20 MIN: CPT | Mod: TEL | Performed by: PEDIATRICS

## 2021-06-21 RX ORDER — TRAZODONE HYDROCHLORIDE 50 MG/1
50-100 TABLET, FILM COATED ORAL AT BEDTIME
Qty: 180 TABLET | Refills: 1 | Status: SHIPPED | OUTPATIENT
Start: 2021-06-21 | End: 2022-01-11

## 2021-06-21 RX ORDER — SERTRALINE HYDROCHLORIDE 100 MG/1
TABLET, FILM COATED ORAL
Qty: 135 TABLET | Refills: 1 | Status: SHIPPED | OUTPATIENT
Start: 2021-06-21 | End: 2022-02-23

## 2021-06-21 ASSESSMENT — PATIENT HEALTH QUESTIONNAIRE - PHQ9
5. POOR APPETITE OR OVEREATING: NOT AT ALL
SUM OF ALL RESPONSES TO PHQ QUESTIONS 1-9: 9

## 2021-06-21 ASSESSMENT — ANXIETY QUESTIONNAIRES
3. WORRYING TOO MUCH ABOUT DIFFERENT THINGS: MORE THAN HALF THE DAYS
IF YOU CHECKED OFF ANY PROBLEMS ON THIS QUESTIONNAIRE, HOW DIFFICULT HAVE THESE PROBLEMS MADE IT FOR YOU TO DO YOUR WORK, TAKE CARE OF THINGS AT HOME, OR GET ALONG WITH OTHER PEOPLE: SOMEWHAT DIFFICULT
2. NOT BEING ABLE TO STOP OR CONTROL WORRYING: SEVERAL DAYS
5. BEING SO RESTLESS THAT IT IS HARD TO SIT STILL: NOT AT ALL
7. FEELING AFRAID AS IF SOMETHING AWFUL MIGHT HAPPEN: SEVERAL DAYS
6. BECOMING EASILY ANNOYED OR IRRITABLE: MORE THAN HALF THE DAYS

## 2021-06-21 NOTE — PROGRESS NOTES
"Afia is a 31 year old who is being evaluated via a billable telephone visit.      What phone number would you like to be contacted at?[phone]  How would you like to obtain your AVS? MyChart    Assessment & Plan     Mild single current episode of major depressive disorder (H)  Improving - will keep sertraline the same and continue to focus on sleep as below.  - sertraline (ZOLOFT) 100 MG tablet; TAKE 1.5 TABLETS BY MOUTH DAILY    Other insomnia  Improving - see PI  - traZODone (DESYREL) 50 MG tablet; Take 1-2 tablets ( mg) by mouth At Bedtime             Patient Instructions   Dear Afia,    It was nice to talk with you today.    Please try taking 100mg of the trazodone to see if this gives any additional benefit.    We will plan to follow up in 5-6 months for your physical.    Take care,    Cristiane Jimenez MD  Internal Medicine/Pediatrics  [facility]          Return in about 5 months (around 11/21/2021) for Routine preventive, with me, in person.    Cristiane Jimenez MD  [facility]    Subjective   Afia is a 31 year old who presents for the following health issues {ACCOMPANIED BY STATEMENT follow up on anxiety and depression    HPI     Depression and Anxiety Follow-Up    How are you doing with your depression since your last visit? Improved     How are you doing with your anxiety since your last visit?  Improved     Are you having other symptoms that might be associated with depression or anxiety? Yes:  sleep issues     Have you had a significant life event?yes new housing issues    Do you have any concerns with your use of alcohol or other drugs? No     After last visit we had decided to target the sleep with trazodone, currently taking 50mg.  No side effects.  We had held off on taking more sertraline.     Feels that mood is up, sleep is better - still having trouble getting enough sleep.  Hard time managing school and work and \"being nocturnal.\"  Patient still has " "anxiety about going to sleep - not getting enough done.  Hard to \"shut her brain down.\"      Social History     Tobacco Use     Smoking status: Current Every Day Smoker     Types: Cigarettes     Smokeless tobacco: Never Used     Tobacco comment: smoke free home   Substance Use Topics     Alcohol use: Yes     Frequency: 2-4 times a month     Drinks per session: 1 or 2     Binge frequency: Less than monthly     Comment: Social     Drug use: No     PHQ 10/10/2020 5/17/2021 6/21/2021   PHQ-9 Total Score 7 15 9   Q9: Thoughts of better off dead/self-harm past 2 weeks Not at all Not at all Not at all     LATISHA-7 SCORE 1/20/2020 10/10/2020 5/17/2021   Total Score 4 (minimal anxiety) 14 (moderate anxiety) -   Total Score 4 14 8         Suicide Assessment Five-step Evaluation and Treatment (SAFE-T)      Review of Systems         Objective           Vitals:  No vitals were obtained today due to virtual visit.    Physical Exam   healthy, alert and no distress  PSYCH: Alert and oriented times 3; coherent speech, normal   rate and volume, able to articulate logical thoughts, able   to abstract reason, no tangential thoughts, no hallucinations   or delusions  Her affect is normal  RESP: No cough, no audible wheezing, able to talk in full sentences  Remainder of exam unable to be completed due to telephone visits                Phone call duration: 7 minutes  "

## 2021-06-21 NOTE — PATIENT INSTRUCTIONS
Dear Afia,    It was nice to talk with you today.    Please try taking 100mg of the trazodone to see if this gives any additional benefit.    We will plan to follow up in 5-6 months for your physical.    Take care,    Cristiane Jimenez MD  Internal Medicine/Pediatrics  [facility]

## 2021-06-25 ENCOUNTER — TELEPHONE (OUTPATIENT)
Dept: PEDIATRICS | Facility: CLINIC | Age: 31
End: 2021-06-25

## 2021-09-19 ENCOUNTER — HEALTH MAINTENANCE LETTER (OUTPATIENT)
Age: 31
End: 2021-09-19

## 2021-11-28 ENCOUNTER — TRANSFERRED RECORDS (OUTPATIENT)
Dept: HEALTH INFORMATION MANAGEMENT | Facility: CLINIC | Age: 31
End: 2021-11-28
Payer: COMMERCIAL

## 2021-11-29 ENCOUNTER — TRANSFERRED RECORDS (OUTPATIENT)
Dept: HEALTH INFORMATION MANAGEMENT | Facility: CLINIC | Age: 31
End: 2021-11-29
Payer: COMMERCIAL

## 2022-01-09 DIAGNOSIS — G47.09 OTHER INSOMNIA: ICD-10-CM

## 2022-01-11 NOTE — TELEPHONE ENCOUNTER
Routing refill request to provider for review/approval because:  Drug interaction warning    Eliud HOBBS RN

## 2022-01-12 ENCOUNTER — OFFICE VISIT (OUTPATIENT)
Dept: OPHTHALMOLOGY | Facility: CLINIC | Age: 32
End: 2022-01-12
Payer: COMMERCIAL

## 2022-01-12 DIAGNOSIS — H52.13 MYOPIA OF BOTH EYES: Primary | ICD-10-CM

## 2022-01-12 PROCEDURE — 92310 CONTACT LENS FITTING OU: CPT | Performed by: OPTOMETRIST

## 2022-01-12 PROCEDURE — 92015 DETERMINE REFRACTIVE STATE: CPT | Performed by: OPTOMETRIST

## 2022-01-12 PROCEDURE — 92014 COMPRE OPH EXAM EST PT 1/>: CPT | Performed by: OPTOMETRIST

## 2022-01-12 RX ORDER — TRAZODONE HYDROCHLORIDE 50 MG/1
50-100 TABLET, FILM COATED ORAL AT BEDTIME
Qty: 180 TABLET | Refills: 1 | Status: SHIPPED | OUTPATIENT
Start: 2022-01-12 | End: 2022-02-23

## 2022-01-12 ASSESSMENT — VISUAL ACUITY
OS_CC: 20/20
CORRECTION_TYPE: CONTACTS
METHOD: SNELLEN - LINEAR
OD_CC: 20/15
OS_CC+: -1
OD_CC+: -2

## 2022-01-12 ASSESSMENT — REFRACTION_CURRENTRX
OS_BASECURVE: 8.40
OD_BASECURVE: 8.40
OS_DIAMETER: 13.8
OD_BRAND: ALCON AIR OPTIX NIGHT AND DAY
OS_SPHERE: -1.75
OS_CYLINDER: SPHERE
OD_DIAMETER: 13.8
OS_BRAND: ALCON AIR OPTIX NIGHT AND DAY
OD_SPHERE: -1.75
OD_CYLINDER: SPHERE

## 2022-01-12 ASSESSMENT — REFRACTION_MANIFEST
OD_SPHERE: -2.00
OS_SPHERE: -2.00
OD_CYLINDER: SPHERE
OS_CYLINDER: SPHERE

## 2022-01-12 ASSESSMENT — CONF VISUAL FIELD
OD_NORMAL: 1
OS_NORMAL: 1
METHOD: COUNTING FINGERS

## 2022-01-12 ASSESSMENT — SLIT LAMP EXAM - LIDS
COMMENTS: NORMAL
COMMENTS: NORMAL

## 2022-01-12 ASSESSMENT — EXTERNAL EXAM - LEFT EYE: OS_EXAM: NORMAL

## 2022-01-12 ASSESSMENT — CUP TO DISC RATIO
OD_RATIO: 0.2
OS_RATIO: 0.2

## 2022-01-12 ASSESSMENT — TONOMETRY
OD_IOP_MMHG: 18
IOP_METHOD: ICARE
OS_IOP_MMHG: 19

## 2022-01-12 ASSESSMENT — EXTERNAL EXAM - RIGHT EYE: OD_EXAM: NORMAL

## 2022-01-12 NOTE — PROGRESS NOTES
Assessment/Plan  (H52.13) Myopia of both eyes  (primary encounter diagnosis)  Comment: Ocular health within normal limits   Plan: REFRACTION [[id number]], HC CONTACT LENS FITTING         COSMETIC LVL 1 (40946.011)        Discussed findings with patient. Ok to continue with extended wear of night and day lenses, but encouraged regular removal when possible. Return to clinic with new pain, redness, or vision changes. Use daily lenses as needed- new samples dispensed today. SRx also updated and dispensed.    Contact Lens Billing  V-Code:  - Soft spherical  Final Contact Lens Rx       Brand Base Curve Diameter Sphere Cylinder    Right Mart[employer]nez Air Optix Night and Day 8.40 13.8 -1.75 Sphere    Left Mart[employer]nez Air Optix Night and Day 8.40 13.8 -1.75 Sphere    Expiration Date: 1/13/2024    Replacement: Daily      Final Contact Lens Rx #2       Brand Base Curve Diameter Sphere Cylinder    Right 1 Day J&J Acuvue Oasys  8.5 14.3 -1.75 Sphere    Left 1 Day J&J Acuvue Oasys  8.5 14.3 -1.75 Sphere    Expiration Date: 1/13/2024    Replacement: Daily        Price per Unit: $75 fitting fee today    These are for cosmetic contact lenses.    Encounter Diagnosis   Name Primary?     Myopia of both eyes Yes          Complete documentation of historical and exam elements from today's encounter can  be found in the full encounter summary report (not reduplicated in this progress  note). I personally obtained the chief complaint(s) and history of present illness. I  confirmed and edited as necessary the review of systems, past medical/surgical  history, family history, social history, and examination findings as documented by  others; and I examined the patient myself. I personally reviewed the relevant tests,  images, and reports as documented above. I formulated and edited as necessary the  assessment and plan and discussed the findings and management plan with the  patient and family.    Killian Subramanian OD

## 2022-01-12 NOTE — NURSING NOTE
Chief Complaints and History of Present Illnesses   Patient presents with     COMPREHENSIVE EYE EXAM     With CTL exam     Chief Complaint(s) and History of Present Illness(es)     COMPREHENSIVE EYE EXAM     Laterality: both eyes    Associated symptoms: Negative for dryness, tearing, flashes, floaters and eye pain    Treatments tried: no treatments    Pain scale: 0/10    Comments: With CTL exam              Comments     Pt notes she is wearing her air optix night and day CTL are working well, seeing well and comfortable, she feels her dailies aren't as comfortable and is interested in a different brand, sees well, just not as comfortable.     Pt notes that she doesn't wear gls.     Tori Auguste COT January 12, 2022 1:11 PM                '

## 2022-02-20 SDOH — ECONOMIC STABILITY: FOOD INSECURITY: WITHIN THE PAST 12 MONTHS, YOU WORRIED THAT YOUR FOOD WOULD RUN OUT BEFORE YOU GOT MONEY TO BUY MORE.: NEVER TRUE

## 2022-02-20 SDOH — ECONOMIC STABILITY: FOOD INSECURITY: WITHIN THE PAST 12 MONTHS, THE FOOD YOU BOUGHT JUST DIDN'T LAST AND YOU DIDN'T HAVE MONEY TO GET MORE.: NEVER TRUE

## 2022-02-20 SDOH — ECONOMIC STABILITY: TRANSPORTATION INSECURITY
IN THE PAST 12 MONTHS, HAS THE LACK OF TRANSPORTATION KEPT YOU FROM MEDICAL APPOINTMENTS OR FROM GETTING MEDICATIONS?: NO

## 2022-02-20 SDOH — ECONOMIC STABILITY: INCOME INSECURITY: IN THE LAST 12 MONTHS, WAS THERE A TIME WHEN YOU WERE NOT ABLE TO PAY THE MORTGAGE OR RENT ON TIME?: NO

## 2022-02-20 SDOH — HEALTH STABILITY: PHYSICAL HEALTH: ON AVERAGE, HOW MANY DAYS PER WEEK DO YOU ENGAGE IN MODERATE TO STRENUOUS EXERCISE (LIKE A BRISK WALK)?: 1 DAY

## 2022-02-20 SDOH — ECONOMIC STABILITY: TRANSPORTATION INSECURITY
IN THE PAST 12 MONTHS, HAS LACK OF TRANSPORTATION KEPT YOU FROM MEETINGS, WORK, OR FROM GETTING THINGS NEEDED FOR DAILY LIVING?: NO

## 2022-02-20 SDOH — ECONOMIC STABILITY: INCOME INSECURITY: HOW HARD IS IT FOR YOU TO PAY FOR THE VERY BASICS LIKE FOOD, HOUSING, MEDICAL CARE, AND HEATING?: NOT VERY HARD

## 2022-02-20 SDOH — HEALTH STABILITY: PHYSICAL HEALTH: ON AVERAGE, HOW MANY MINUTES DO YOU ENGAGE IN EXERCISE AT THIS LEVEL?: 20 MIN

## 2022-02-20 ASSESSMENT — ENCOUNTER SYMPTOMS
EYE PAIN: 0
WEAKNESS: 0
NAUSEA: 0
COUGH: 0
CONSTIPATION: 0
HEMATOCHEZIA: 0
ABDOMINAL PAIN: 0
PARESTHESIAS: 0
SORE THROAT: 0
JOINT SWELLING: 0
HEARTBURN: 1
PALPITATIONS: 0
ARTHRALGIAS: 0
CHILLS: 0
DIZZINESS: 0
FEVER: 0
HEMATURIA: 0
BREAST MASS: 0
SHORTNESS OF BREATH: 0
DYSURIA: 0
MYALGIAS: 0
FREQUENCY: 0
NERVOUS/ANXIOUS: 0
DIARRHEA: 0
HEADACHES: 1

## 2022-02-20 ASSESSMENT — ANXIETY QUESTIONNAIRES
6. BECOMING EASILY ANNOYED OR IRRITABLE: MORE THAN HALF THE DAYS
5. BEING SO RESTLESS THAT IT IS HARD TO SIT STILL: NOT AT ALL
GAD7 TOTAL SCORE: 4
3. WORRYING TOO MUCH ABOUT DIFFERENT THINGS: NOT AT ALL
GAD7 TOTAL SCORE: 4
4. TROUBLE RELAXING: SEVERAL DAYS
1. FEELING NERVOUS, ANXIOUS, OR ON EDGE: SEVERAL DAYS
7. FEELING AFRAID AS IF SOMETHING AWFUL MIGHT HAPPEN: NOT AT ALL
7. FEELING AFRAID AS IF SOMETHING AWFUL MIGHT HAPPEN: NOT AT ALL
2. NOT BEING ABLE TO STOP OR CONTROL WORRYING: NOT AT ALL
GAD7 TOTAL SCORE: 4

## 2022-02-20 ASSESSMENT — PATIENT HEALTH QUESTIONNAIRE - PHQ9
10. IF YOU CHECKED OFF ANY PROBLEMS, HOW DIFFICULT HAVE THESE PROBLEMS MADE IT FOR YOU TO DO YOUR WORK, TAKE CARE OF THINGS AT HOME, OR GET ALONG WITH OTHER PEOPLE: SOMEWHAT DIFFICULT
SUM OF ALL RESPONSES TO PHQ QUESTIONS 1-9: 3
SUM OF ALL RESPONSES TO PHQ QUESTIONS 1-9: 3

## 2022-02-20 ASSESSMENT — SOCIAL DETERMINANTS OF HEALTH (SDOH)
ARE YOU MARRIED, WIDOWED, DIVORCED, SEPARATED, NEVER MARRIED, OR LIVING WITH A PARTNER?: NEVER MARRIED
IN A TYPICAL WEEK, HOW MANY TIMES DO YOU TALK ON THE PHONE WITH FAMILY, FRIENDS, OR NEIGHBORS?: TWICE A WEEK
HOW OFTEN DO YOU GET TOGETHER WITH FRIENDS OR RELATIVES?: ONCE A WEEK
DO YOU BELONG TO ANY CLUBS OR ORGANIZATIONS SUCH AS CHURCH GROUPS UNIONS, FRATERNAL OR ATHLETIC GROUPS, OR SCHOOL GROUPS?: NO
HOW OFTEN DO YOU ATTEND CHURCH OR RELIGIOUS SERVICES?: NEVER

## 2022-02-20 ASSESSMENT — LIFESTYLE VARIABLES
HOW OFTEN DO YOU HAVE A DRINK CONTAINING ALCOHOL: MONTHLY OR LESS
HOW MANY STANDARD DRINKS CONTAINING ALCOHOL DO YOU HAVE ON A TYPICAL DAY: 3 OR 4
HOW OFTEN DO YOU HAVE SIX OR MORE DRINKS ON ONE OCCASION: LESS THAN MONTHLY

## 2022-02-21 ASSESSMENT — ANXIETY QUESTIONNAIRES: GAD7 TOTAL SCORE: 4

## 2022-02-21 ASSESSMENT — PATIENT HEALTH QUESTIONNAIRE - PHQ9: SUM OF ALL RESPONSES TO PHQ QUESTIONS 1-9: 3

## 2022-02-23 ENCOUNTER — OFFICE VISIT (OUTPATIENT)
Dept: PEDIATRICS | Facility: CLINIC | Age: 32
End: 2022-02-23
Payer: COMMERCIAL

## 2022-02-23 VITALS
BODY MASS INDEX: 38.25 KG/M2 | TEMPERATURE: 96.9 F | RESPIRATION RATE: 18 BRPM | DIASTOLIC BLOOD PRESSURE: 84 MMHG | SYSTOLIC BLOOD PRESSURE: 114 MMHG | OXYGEN SATURATION: 96 % | HEART RATE: 82 BPM | WEIGHT: 237 LBS

## 2022-02-23 DIAGNOSIS — F32.0 MILD SINGLE CURRENT EPISODE OF MAJOR DEPRESSIVE DISORDER (H): ICD-10-CM

## 2022-02-23 DIAGNOSIS — Z11.59 NEED FOR HEPATITIS C SCREENING TEST: ICD-10-CM

## 2022-02-23 DIAGNOSIS — Z13.220 SCREENING FOR HYPERLIPIDEMIA: ICD-10-CM

## 2022-02-23 DIAGNOSIS — N89.8 VAGINAL ODOR: ICD-10-CM

## 2022-02-23 DIAGNOSIS — Z12.4 CERVICAL CANCER SCREENING: Primary | ICD-10-CM

## 2022-02-23 DIAGNOSIS — Z72.0 TOBACCO ABUSE: ICD-10-CM

## 2022-02-23 DIAGNOSIS — Z01.411 ENCOUNTER FOR GYNECOLOGICAL EXAMINATION WITH ABNORMAL FINDING: ICD-10-CM

## 2022-02-23 DIAGNOSIS — G47.09 OTHER INSOMNIA: ICD-10-CM

## 2022-02-23 DIAGNOSIS — Z11.3 SCREEN FOR STD (SEXUALLY TRANSMITTED DISEASE): ICD-10-CM

## 2022-02-23 LAB
CLUE CELLS: ABNORMAL
TRICHOMONAS, WET PREP: ABNORMAL
WBC'S/HIGH POWER FIELD, WET PREP: ABNORMAL
YEAST, WET PREP: ABNORMAL

## 2022-02-23 PROCEDURE — 86803 HEPATITIS C AB TEST: CPT | Performed by: PEDIATRICS

## 2022-02-23 PROCEDURE — 36415 COLL VENOUS BLD VENIPUNCTURE: CPT | Performed by: PEDIATRICS

## 2022-02-23 PROCEDURE — 86780 TREPONEMA PALLIDUM: CPT | Performed by: PEDIATRICS

## 2022-02-23 PROCEDURE — 80061 LIPID PANEL: CPT | Performed by: PEDIATRICS

## 2022-02-23 PROCEDURE — 87624 HPV HI-RISK TYP POOLED RSLT: CPT | Performed by: PEDIATRICS

## 2022-02-23 PROCEDURE — 87491 CHLMYD TRACH DNA AMP PROBE: CPT | Performed by: PEDIATRICS

## 2022-02-23 PROCEDURE — 87591 N.GONORRHOEAE DNA AMP PROB: CPT | Performed by: PEDIATRICS

## 2022-02-23 PROCEDURE — 87389 HIV-1 AG W/HIV-1&-2 AB AG IA: CPT | Performed by: PEDIATRICS

## 2022-02-23 PROCEDURE — 87210 SMEAR WET MOUNT SALINE/INK: CPT | Performed by: PEDIATRICS

## 2022-02-23 PROCEDURE — 99213 OFFICE O/P EST LOW 20 MIN: CPT | Mod: 25 | Performed by: PEDIATRICS

## 2022-02-23 PROCEDURE — G0145 SCR C/V CYTO,THINLAYER,RESCR: HCPCS | Performed by: PEDIATRICS

## 2022-02-23 PROCEDURE — 99395 PREV VISIT EST AGE 18-39: CPT | Performed by: PEDIATRICS

## 2022-02-23 PROCEDURE — 96127 BRIEF EMOTIONAL/BEHAV ASSMT: CPT | Performed by: PEDIATRICS

## 2022-02-23 RX ORDER — TRAZODONE HYDROCHLORIDE 50 MG/1
75 TABLET, FILM COATED ORAL AT BEDTIME
Qty: 135 TABLET | Refills: 3 | Status: SHIPPED | OUTPATIENT
Start: 2022-02-23 | End: 2023-03-29

## 2022-02-23 RX ORDER — SERTRALINE HYDROCHLORIDE 100 MG/1
TABLET, FILM COATED ORAL
Qty: 135 TABLET | Refills: 1 | Status: SHIPPED | OUTPATIENT
Start: 2022-02-23 | End: 2022-08-23

## 2022-02-23 ASSESSMENT — ENCOUNTER SYMPTOMS
HEADACHES: 1
CONSTIPATION: 0
FEVER: 0
NAUSEA: 0
EYE PAIN: 0
DIZZINESS: 0
SORE THROAT: 0
SHORTNESS OF BREATH: 0
PALPITATIONS: 0
WEAKNESS: 0
HEARTBURN: 1
HEMATURIA: 0
JOINT SWELLING: 0
DYSURIA: 0
BREAST MASS: 0
COUGH: 0
NERVOUS/ANXIOUS: 0
MYALGIAS: 0
HEMATOCHEZIA: 0
DIARRHEA: 0
ABDOMINAL PAIN: 0
PARESTHESIAS: 0
ARTHRALGIAS: 0
FREQUENCY: 0
CHILLS: 0

## 2022-02-23 NOTE — PROGRESS NOTES
SUBJECTIVE:   CC: Afia Cook is an 31 year old woman who presents for preventive health visit.     Patient has been advised of split billing requirements and indicates understanding: Yes  Healthy Habits:     Getting at least 3 servings of Calcium per day:  Yes    Bi-annual eye exam:  Yes    Dental care twice a year:  NO    Sleep apnea or symptoms of sleep apnea:  Daytime drowsiness    Diet:  Regular (no restrictions)    Frequency of exercise:  6-7 days/week    Duration of exercise:  45-60 minutes    Taking medications regularly:  Yes    Medication side effects:  Not applicable    PHQ-2 Total Score: 0    Additional concerns today:  No      Half way through marriage counseling program. Overall doing well.  Happy with current doses of sertraline and trazodone.     Today's PHQ-2 Score:   PHQ-2 ( 1999 Pfizer) 2/20/2022   Q1: Little interest or pleasure in doing things 0   Q2: Feeling down, depressed or hopeless 0   PHQ-2 Score 0   PHQ-2 Total Score (12-17 Years)- Positive if 3 or more points; Administer PHQ-A if positive -   Q1: Little interest or pleasure in doing things Not at all   Q2: Feeling down, depressed or hopeless Not at all   PHQ-2 Score 0       Abuse: Current or Past (Physical, Sexual or Emotional) - No  Do you feel safe in your environment? Yes    Have you ever done Advance Care Planning? (For example, a Health Directive, POLST, or a discussion with a medical provider or your loved ones about your wishes): No, advance care planning information given to patient to review.  Patient plans to discuss their wishes with loved ones or provider.      Social History     Tobacco Use     Smoking status: Current Every Day Smoker     Packs/day: 0.75     Years: 7.00     Pack years: 5.25     Types: Cigarettes     Start date: 5/30/2015     Smokeless tobacco: Never Used     Tobacco comment: Started cutting back to quit in 2019, then the pandemic and grad school happened   Substance Use Topics     Alcohol use: Yes      Comment: a couple times a month       Alcohol Use 2/20/2022   Prescreen: >3 drinks/day or >7 drinks/week? No   Prescreen: >3 drinks/day or >7 drinks/week? -       Reviewed orders with patient.  Reviewed health maintenance and updated orders accordingly - Yes      Breast Cancer Screening:    FHS-7:   Breast CA Risk Assessment (FHS-7) 2/20/2022   Did any of your first-degree relatives have breast or ovarian cancer? No   Did any of your relatives have bilateral breast cancer? No   Did any man in your family have breast cancer? No   Did any woman in your family have breast and ovarian cancer? Yes   Did any woman in your family have breast cancer before age 50 y? Unknown   Do you have 2 or more relatives with breast and/or ovarian cancer? Unknown   Do you have 2 or more relatives with breast and/or bowel cancer? Unknown       Patient under 40 years of age: Routine Mammogram Screening not recommended.   Pertinent mammograms are reviewed under the imaging tab.    History of abnormal Pap smear: NO - age 30-65 PAP every 5 years with negative HPV co-testing recommended  PAP / HPV Latest Ref Rng & Units 12/20/2018 9/25/2015 8/22/2012   PAP (Historical) - NIL NIL NIL   HPV16 NEG - Negative -   HPV18 NEG - Negative -   HRHPV NEG - Negative -     Reviewed and updated as needed this visit by clinical staff   Tobacco  Allergies  Meds     Fam Hx          Reviewed and updated as needed this visit by Provider                     Review of Systems   Constitutional: Negative for chills and fever.   HENT: Negative for congestion, ear pain, hearing loss and sore throat.    Eyes: Negative for pain and visual disturbance.   Respiratory: Negative for cough and shortness of breath.    Cardiovascular: Negative for chest pain, palpitations and peripheral edema.   Gastrointestinal: Positive for heartburn. Negative for abdominal pain, constipation, diarrhea, hematochezia and nausea.   Breasts:  Negative for tenderness, breast mass and  discharge.   Genitourinary: Negative for dysuria, frequency, genital sores, hematuria, pelvic pain, urgency, vaginal bleeding and vaginal discharge.   Musculoskeletal: Negative for arthralgias, joint swelling and myalgias.   Skin: Negative for rash.   Neurological: Positive for headaches. Negative for dizziness, weakness and paresthesias.   Psychiatric/Behavioral: Negative for mood changes. The patient is not nervous/anxious.           OBJECTIVE:   /84 (BP Location: Right arm, Patient Position: Chair, Cuff Size: Adult Large)   Pulse 82   Temp 96.9  F (36.1  C) (Tympanic)   Resp 18   Wt 107.5 kg (237 lb)   SpO2 96%   BMI 38.25 kg/m    Physical Exam  GENERAL: healthy, alert and no distress  EYES: Eyes grossly normal to inspection, PERRL and conjunctivae and sclerae normal  HENT: ear canals and TM's normal, nose and mouth without ulcers or lesions  NECK: no adenopathy, no asymmetry, masses, or scars and thyroid normal to palpation  RESP: lungs clear to auscultation - no rales, rhonchi or wheezes  CV: regular rate and rhythm, normal S1 S2, no S3 or S4, no murmur, click or rub, no peripheral edema and peripheral pulses strong  ABDOMEN: soft, nontender, no hepatosplenomegaly, no masses and bowel sounds normal   (female): normal female external genitalia, normal urethral meatus, vaginal mucosa pink, moist, well rugated, and normal cervix/adnexa/uterus without masses or discharge  MS: no gross musculoskeletal defects noted, no edema  SKIN: no suspicious lesions or rashes  NEURO: Normal strength and tone, mentation intact and speech normal  PSYCH: mentation appears normal, affect normal/bright    Diagnostic Test Results:  Labs reviewed in Epic    ASSESSMENT/PLAN:   (Z01.411) Encounter for gynecological examination with abnormal finding  (primary encounter diagnosis)  Comment:  Plan:    (Z11.59) Need for hepatitis C screening test  Comment:  Plan: Hepatitis C Screen Reflex to HCV RNA Quant and         Genotype    "        (Z13.220) Screening for hyperlipidemia  Comment:  Plan: Lipid panel reflex to direct LDL Fasting           (Z12.4) Cervical cancer screening  Comment:  Plan: Pap Screen with HPV - recommended age 30 - 65         years           (F32.0) Mild single current episode of major depressive disorder (H)  Comment: well controlled, continue and follow up in 6m  Plan: sertraline (ZOLOFT) 100 MG tablet            (G47.09) Other insomnia  Comment: working well - 75mg is current dose, continue.   Plan: traZODone (DESYREL) 50 MG tablet            (Z11.3) Screen for STD (sexually transmitted disease)  Comment: no concern for pregnancy, recent break up  Plan: NEISSERIA GONORRHOEA PCR, CHLAMYDIA TRACHOMATIS        PCR, HIV Antigen Antibody Combo, Treponema Abs         w Reflex to RPR and Titer            (N89.8) Vaginal odor  Comment:   Plan: Wet prep - Clinic Collect            (Z72.0) Tobacco abuse  Comment: not ready to quit  Plan: will discuss again in the future    Patient has been advised of split billing requirements and indicates understanding: Yes    COUNSELING:  Reviewed preventive health counseling, as reflected in patient instructions  Estimated body mass index is 38.25 kg/m  as calculated from the following:    Height as of 11/13/19: 1.676 m (5' 6\").    Weight as of this encounter: 107.5 kg (237 lb).        She reports that she has been smoking cigarettes. She started smoking about 6 years ago. She has a 5.25 pack-year smoking history. She has never used smokeless tobacco.  Tobacco Cessation Action Plan:   Information offered: Patient not interested at this time      Counseling Resources:  ATP IV Guidelines  Pooled Cohorts Equation Calculator  Breast Cancer Risk Calculator  BRCA-Related Cancer Risk Assessment: FHS-7 Tool  FRAX Risk Assessment  ICSI Preventive Guidelines  Dietary Guidelines for Americans, 2010  USDA's MyPlate  ASA Prophylaxis  Lung CA Screening    Cristiane Jimenez MD  [facility] " KARSON  Answers for HPI/ROS submitted by the patient on 2/20/2022  If you checked off any problems, how difficult have these problems made it for you to do your work, take care of things at home, or get along with other people?: Somewhat difficult  PHQ9 TOTAL SCORE: 3  LATISHA 7 TOTAL SCORE: 4

## 2022-02-23 NOTE — PATIENT INSTRUCTIONS
STD labs today      Preventive Health Recommendations  Female Ages 26 - 39  Yearly exam:   See your health care provider every year in order to    Review health changes.     Discuss preventive care.      Review your medicines if you your doctor has prescribed any.    Until age 30: Get a Pap test every three years (more often if you have had an abnormal result).    After age 30: Talk to your doctor about whether you should have a Pap test every 3 years or have a Pap test with HPV screening every 5 years.   You do not need a Pap test if your uterus was removed (hysterectomy) and you have not had cancer.  You should be tested each year for STDs (sexually transmitted diseases), if you're at risk.   Talk to your provider about how often to have your cholesterol checked.  If you are at risk for diabetes, you should have a diabetes test (fasting glucose).  Shots: Get a flu shot each year. Get a tetanus shot every 10 years.   Nutrition:     Eat at least 5 servings of fruits and vegetables each day.    Eat whole-grain bread, whole-wheat pasta and brown rice instead of white grains and rice.    Get adequate Calcium and Vitamin D.     Lifestyle    Exercise at least 150 minutes a week (30 minutes a day, 5 days of the week). This will help you control your weight and prevent disease.    Limit alcohol to one drink per day.    No smoking.     Wear sunscreen to prevent skin cancer.    See your dentist every six months for an exam and cleaning.

## 2022-02-24 LAB
CHOLEST SERPL-MCNC: 169 MG/DL
FASTING STATUS PATIENT QL REPORTED: YES
HDLC SERPL-MCNC: 37 MG/DL
LDLC SERPL CALC-MCNC: 99 MG/DL
NONHDLC SERPL-MCNC: 132 MG/DL
T PALLIDUM AB SER QL: NONREACTIVE
TRIGL SERPL-MCNC: 166 MG/DL

## 2022-02-25 LAB
C TRACH DNA SPEC QL NAA+PROBE: NEGATIVE
HCV AB SERPL QL IA: NONREACTIVE
HIV 1+2 AB+HIV1 P24 AG SERPL QL IA: NONREACTIVE
N GONORRHOEA DNA SPEC QL NAA+PROBE: NEGATIVE

## 2022-02-28 LAB
BKR LAB AP GYN ADEQUACY: NORMAL
BKR LAB AP GYN INTERPRETATION: NORMAL
BKR LAB AP HPV REFLEX: NORMAL
BKR LAB AP PREVIOUS ABNORMAL: NORMAL
PATH REPORT.COMMENTS IMP SPEC: NORMAL
PATH REPORT.COMMENTS IMP SPEC: NORMAL
PATH REPORT.RELEVANT HX SPEC: NORMAL

## 2022-03-02 ENCOUNTER — PATIENT OUTREACH (OUTPATIENT)
Dept: PEDIATRICS | Facility: CLINIC | Age: 32
End: 2022-03-02
Payer: COMMERCIAL

## 2022-03-02 LAB
HUMAN PAPILLOMA VIRUS 16 DNA: NEGATIVE
HUMAN PAPILLOMA VIRUS 18 DNA: NEGATIVE
HUMAN PAPILLOMA VIRUS FINAL DIAGNOSIS: ABNORMAL
HUMAN PAPILLOMA VIRUS OTHER HR: POSITIVE

## 2022-07-11 ENCOUNTER — E-VISIT (OUTPATIENT)
Dept: PEDIATRICS | Facility: CLINIC | Age: 32
End: 2022-07-11
Payer: COMMERCIAL

## 2022-07-11 DIAGNOSIS — Z11.3 SCREEN FOR STD (SEXUALLY TRANSMITTED DISEASE): Primary | ICD-10-CM

## 2022-07-11 PROCEDURE — 99421 OL DIG E/M SVC 5-10 MIN: CPT | Performed by: PEDIATRICS

## 2022-07-12 NOTE — PATIENT INSTRUCTIONS
Vega Oreilly,    I have ordered STD screening labs for you (gonorrhea, chlamydia, HIV, and syphilis).  We check HPV during the PAP smear, so this will be done during you next PAP at your physical in 2023.       I have placed the orders.  Please schedule an appointment with the lab right here in [facility], or call [phone].  I will let you know when the results are back and next steps to take.    Cristiane Jimenez MD  Internal Medicine/Pediatrics  [facility]

## 2022-07-30 ENCOUNTER — LAB (OUTPATIENT)
Dept: LAB | Facility: CLINIC | Age: 32
End: 2022-07-30
Payer: COMMERCIAL

## 2022-07-30 DIAGNOSIS — Z11.3 SCREEN FOR STD (SEXUALLY TRANSMITTED DISEASE): ICD-10-CM

## 2022-07-30 PROCEDURE — 87491 CHLMYD TRACH DNA AMP PROBE: CPT

## 2022-07-30 PROCEDURE — 86780 TREPONEMA PALLIDUM: CPT

## 2022-07-30 PROCEDURE — 36415 COLL VENOUS BLD VENIPUNCTURE: CPT

## 2022-07-30 PROCEDURE — 87389 HIV-1 AG W/HIV-1&-2 AB AG IA: CPT

## 2022-07-30 PROCEDURE — 87591 N.GONORRHOEAE DNA AMP PROB: CPT

## 2022-08-01 LAB
C TRACH DNA SPEC QL NAA+PROBE: NEGATIVE
HIV 1+2 AB+HIV1 P24 AG SERPL QL IA: NONREACTIVE
N GONORRHOEA DNA SPEC QL NAA+PROBE: NEGATIVE
T PALLIDUM AB SER QL: NONREACTIVE

## 2022-08-23 DIAGNOSIS — F32.0 MILD SINGLE CURRENT EPISODE OF MAJOR DEPRESSIVE DISORDER (H): ICD-10-CM

## 2022-08-23 NOTE — TELEPHONE ENCOUNTER
Routing refill request to provider for review/approval because:  Drug interaction warning  Labs not current:  PHQ-9    Eliud HOBBS RN

## 2022-08-24 RX ORDER — SERTRALINE HYDROCHLORIDE 100 MG/1
TABLET, FILM COATED ORAL
Qty: 135 TABLET | Refills: 0 | Status: SHIPPED | OUTPATIENT
Start: 2022-08-24 | End: 2022-11-29

## 2022-11-21 ENCOUNTER — HEALTH MAINTENANCE LETTER (OUTPATIENT)
Age: 32
End: 2022-11-21

## 2022-11-26 DIAGNOSIS — F32.0 MILD SINGLE CURRENT EPISODE OF MAJOR DEPRESSIVE DISORDER (H): ICD-10-CM

## 2022-11-28 NOTE — TELEPHONE ENCOUNTER
Routing refill request to provider for review/approval because:  Patient does not have a PHQ-9 score on file in the past 6 months.     PHQ 2/20/2022   PHQ-9 Total Score 3   Q9: Thoughts of better off dead/self-harm past 2 weeks Not at all     Lolis HERNANDEZ RN   Patient Advocate Liaison (PAL)  [facility]

## 2022-11-29 RX ORDER — SERTRALINE HYDROCHLORIDE 100 MG/1
TABLET, FILM COATED ORAL
Qty: 45 TABLET | Refills: 0 | Status: SHIPPED | OUTPATIENT
Start: 2022-11-29 | End: 2023-01-17

## 2022-11-29 NOTE — TELEPHONE ENCOUNTER
One month refill only - overdue for 6m follow up.  Please schedule video visit with me.    Cristiane Jimenez MD  Internal Medicine/Pediatrics  [facility]

## 2022-11-29 NOTE — TELEPHONE ENCOUNTER
1st attempt: left VM to return call. If pt calls back, please relay PCP's message below and assist in scheduling VV.     Natalia Roy MA 10:49 AM 11/29/2022

## 2023-01-03 DIAGNOSIS — F32.0 MILD SINGLE CURRENT EPISODE OF MAJOR DEPRESSIVE DISORDER (H): ICD-10-CM

## 2023-01-04 RX ORDER — SERTRALINE HYDROCHLORIDE 100 MG/1
TABLET, FILM COATED ORAL
Qty: 45 TABLET | Refills: 0 | OUTPATIENT
Start: 2023-01-04

## 2023-01-04 NOTE — TELEPHONE ENCOUNTER
Routing refill request to provider for review/approval because:  Lolis given x1 and patient did not follow up, please advise  PHQ-9 not up-to-date.    Carmencita SAMSON RN, BSN

## 2023-01-04 NOTE — TELEPHONE ENCOUNTER
No refill. Lolis period already given.    Cristiane Jimenez MD  Internal Medicine/Pediatrics  [facility]

## 2023-01-16 NOTE — TELEPHONE ENCOUNTER
Spoke with pt and scheduled for Px with resident provider (did not want to wait for PCP availability)      Please refill medication until Physical 3/2/2022      Isadora Toscano MA 11:34 AM 1/16/2023

## 2023-01-17 RX ORDER — SERTRALINE HYDROCHLORIDE 100 MG/1
150 TABLET, FILM COATED ORAL DAILY
Qty: 45 TABLET | Refills: 0 | Status: SHIPPED | OUTPATIENT
Start: 2023-01-17 | End: 2023-02-15

## 2023-02-09 PROBLEM — R87.810 CERVICAL HIGH RISK HPV (HUMAN PAPILLOMAVIRUS) TEST POSITIVE: Status: ACTIVE | Noted: 2022-03-02

## 2023-02-14 DIAGNOSIS — F32.0 MILD SINGLE CURRENT EPISODE OF MAJOR DEPRESSIVE DISORDER (H): ICD-10-CM

## 2023-02-14 NOTE — TELEPHONE ENCOUNTER
Routing refill request to provider for review/approval because:  Labs not current:  PHQ-9  Lolis x2 refill given    PHQ 5/17/2021 6/21/2021 2/20/2022   PHQ-9 Total Score 15 9 3   Q9: Thoughts of better off dead/self-harm past 2 weeks Not at all Not at all Not at all     Pt has upcoming appointment with Dr. Cabrera 3/2/23      Norma CORONADO RN  [facility]

## 2023-02-15 RX ORDER — SERTRALINE HYDROCHLORIDE 100 MG/1
TABLET, FILM COATED ORAL
Qty: 45 TABLET | Refills: 0 | Status: SHIPPED | OUTPATIENT
Start: 2023-02-15 | End: 2023-03-02

## 2023-02-27 NOTE — PATIENT INSTRUCTIONS
It was xiomara to see you in clinic today. You were seen for annual wellness visit.     Today we checked some general screening labs including general electrolyte, lipid, and STI screening. Myself or a colleague from our [place] clinic team will reach out to you with the results in the next several days as they are available. If any actions or interventions are required we will advise you at that time. Please do not to call or message the clinic if you have any questions. Have a great rest of your day!     -Missy Cabrera MD       Preventive Health Recommendations  Female Ages 26 - 39  Yearly exam:   See your health care provider every year in order to  Review health changes.   Discuss preventive care.    Review your medicines if you your doctor has prescribed any.    Until age 30: Get a Pap test every three years (more often if you have had an abnormal result).    After age 30: Talk to your doctor about whether you should have a Pap test every 3 years or have a Pap test with HPV screening every 5 years.   You do not need a Pap test if your uterus was removed (hysterectomy) and you have not had cancer.  You should be tested each year for STDs (sexually transmitted diseases), if you're at risk.   Talk to your provider about how often to have your cholesterol checked.  If you are at risk for diabetes, you should have a diabetes test (fasting glucose).  Shots: Get a flu shot each year. Get a tetanus shot every 10 years.   Nutrition:   Eat at least 5 servings of fruits and vegetables each day.  Eat whole-grain bread, whole-wheat pasta and brown rice instead of white grains and rice.  Get adequate Calcium and Vitamin D.     Lifestyle  Exercise at least 150 minutes a week (30 minutes a day, 5 days of the week). This will help you control your weight and prevent disease.  Limit alcohol to one drink per day.  No smoking.   Wear sunscreen to prevent skin cancer.  See your dentist every six months for an exam and  cleaning.    Preventive Health Recommendations  Female Ages 26 - 39  Yearly exam:   See your health care provider every year in order to  Review health changes.   Discuss preventive care.    Review your medicines if you your doctor has prescribed any.    Until age 30: Get a Pap test every three years (more often if you have had an abnormal result).    After age 30: Talk to your doctor about whether you should have a Pap test every 3 years or have a Pap test with HPV screening every 5 years.   You do not need a Pap test if your uterus was removed (hysterectomy) and you have not had cancer.  You should be tested each year for STDs (sexually transmitted diseases), if you're at risk.   Talk to your provider about how often to have your cholesterol checked.  If you are at risk for diabetes, you should have a diabetes test (fasting glucose).  Shots: Get a flu shot each year. Get a tetanus shot every 10 years.   Nutrition:   Eat at least 5 servings of fruits and vegetables each day.  Eat whole-grain bread, whole-wheat pasta and brown rice instead of white grains and rice.  Get adequate Calcium and Vitamin D.     Lifestyle  Exercise at least 150 minutes a week (30 minutes a day, 5 days of the week). This will help you control your weight and prevent disease.  Limit alcohol to one drink per day.  No smoking.   Wear sunscreen to prevent skin cancer.  See your dentist every six months for an exam and cleaning.

## 2023-03-02 ENCOUNTER — OFFICE VISIT (OUTPATIENT)
Dept: PEDIATRICS | Facility: CLINIC | Age: 33
End: 2023-03-02
Payer: COMMERCIAL

## 2023-03-02 VITALS
WEIGHT: 235 LBS | TEMPERATURE: 98.5 F | HEART RATE: 80 BPM | DIASTOLIC BLOOD PRESSURE: 84 MMHG | BODY MASS INDEX: 37.77 KG/M2 | HEIGHT: 66 IN | OXYGEN SATURATION: 97 % | SYSTOLIC BLOOD PRESSURE: 130 MMHG

## 2023-03-02 DIAGNOSIS — G47.09 OTHER INSOMNIA: ICD-10-CM

## 2023-03-02 DIAGNOSIS — Z72.0 TOBACCO ABUSE: ICD-10-CM

## 2023-03-02 DIAGNOSIS — F32.0 MILD SINGLE CURRENT EPISODE OF MAJOR DEPRESSIVE DISORDER (H): ICD-10-CM

## 2023-03-02 DIAGNOSIS — Z13.220 SCREENING FOR LIPID DISORDERS: ICD-10-CM

## 2023-03-02 DIAGNOSIS — Z00.00 ROUTINE GENERAL MEDICAL EXAMINATION AT A HEALTH CARE FACILITY: Primary | ICD-10-CM

## 2023-03-02 DIAGNOSIS — Z12.4 SCREENING FOR CERVICAL CANCER: ICD-10-CM

## 2023-03-02 DIAGNOSIS — Z11.3 ROUTINE SCREENING FOR STI (SEXUALLY TRANSMITTED INFECTION): ICD-10-CM

## 2023-03-02 LAB
CHOLEST SERPL-MCNC: 169 MG/DL
HBA1C MFR BLD: 5.6 % (ref 0–5.6)
HDLC SERPL-MCNC: 41 MG/DL
LDLC SERPL CALC-MCNC: 105 MG/DL
NONHDLC SERPL-MCNC: 128 MG/DL
TRIGL SERPL-MCNC: 115 MG/DL

## 2023-03-02 PROCEDURE — 90471 IMMUNIZATION ADMIN: CPT | Performed by: STUDENT IN AN ORGANIZED HEALTH CARE EDUCATION/TRAINING PROGRAM

## 2023-03-02 PROCEDURE — 87491 CHLMYD TRACH DNA AMP PROBE: CPT | Performed by: STUDENT IN AN ORGANIZED HEALTH CARE EDUCATION/TRAINING PROGRAM

## 2023-03-02 PROCEDURE — 0124A COVID-19 VACCINE BIVALENT BOOSTER 12+ (PFIZER): CPT | Performed by: STUDENT IN AN ORGANIZED HEALTH CARE EDUCATION/TRAINING PROGRAM

## 2023-03-02 PROCEDURE — 99395 PREV VISIT EST AGE 18-39: CPT | Mod: 25 | Performed by: STUDENT IN AN ORGANIZED HEALTH CARE EDUCATION/TRAINING PROGRAM

## 2023-03-02 PROCEDURE — 87624 HPV HI-RISK TYP POOLED RSLT: CPT | Performed by: STUDENT IN AN ORGANIZED HEALTH CARE EDUCATION/TRAINING PROGRAM

## 2023-03-02 PROCEDURE — 36415 COLL VENOUS BLD VENIPUNCTURE: CPT | Performed by: STUDENT IN AN ORGANIZED HEALTH CARE EDUCATION/TRAINING PROGRAM

## 2023-03-02 PROCEDURE — 96127 BRIEF EMOTIONAL/BEHAV ASSMT: CPT | Performed by: STUDENT IN AN ORGANIZED HEALTH CARE EDUCATION/TRAINING PROGRAM

## 2023-03-02 PROCEDURE — 80061 LIPID PANEL: CPT | Performed by: STUDENT IN AN ORGANIZED HEALTH CARE EDUCATION/TRAINING PROGRAM

## 2023-03-02 PROCEDURE — 87389 HIV-1 AG W/HIV-1&-2 AB AG IA: CPT | Performed by: STUDENT IN AN ORGANIZED HEALTH CARE EDUCATION/TRAINING PROGRAM

## 2023-03-02 PROCEDURE — 90686 IIV4 VACC NO PRSV 0.5 ML IM: CPT | Performed by: STUDENT IN AN ORGANIZED HEALTH CARE EDUCATION/TRAINING PROGRAM

## 2023-03-02 PROCEDURE — 86780 TREPONEMA PALLIDUM: CPT | Performed by: STUDENT IN AN ORGANIZED HEALTH CARE EDUCATION/TRAINING PROGRAM

## 2023-03-02 PROCEDURE — 99213 OFFICE O/P EST LOW 20 MIN: CPT | Mod: 25 | Performed by: STUDENT IN AN ORGANIZED HEALTH CARE EDUCATION/TRAINING PROGRAM

## 2023-03-02 PROCEDURE — G0145 SCR C/V CYTO,THINLAYER,RESCR: HCPCS | Performed by: STUDENT IN AN ORGANIZED HEALTH CARE EDUCATION/TRAINING PROGRAM

## 2023-03-02 PROCEDURE — 87591 N.GONORRHOEAE DNA AMP PROB: CPT | Performed by: STUDENT IN AN ORGANIZED HEALTH CARE EDUCATION/TRAINING PROGRAM

## 2023-03-02 PROCEDURE — 91312 COVID-19 VACCINE BIVALENT BOOSTER 12+ (PFIZER): CPT | Performed by: STUDENT IN AN ORGANIZED HEALTH CARE EDUCATION/TRAINING PROGRAM

## 2023-03-02 PROCEDURE — 83036 HEMOGLOBIN GLYCOSYLATED A1C: CPT | Performed by: STUDENT IN AN ORGANIZED HEALTH CARE EDUCATION/TRAINING PROGRAM

## 2023-03-02 RX ORDER — SERTRALINE HYDROCHLORIDE 100 MG/1
150 TABLET, FILM COATED ORAL DAILY
Qty: 135 TABLET | Refills: 3 | Status: SHIPPED | OUTPATIENT
Start: 2023-03-02 | End: 2024-05-30

## 2023-03-02 SDOH — ECONOMIC STABILITY: FOOD INSECURITY: WITHIN THE PAST 12 MONTHS, THE FOOD YOU BOUGHT JUST DIDN'T LAST AND YOU DIDN'T HAVE MONEY TO GET MORE.: NEVER TRUE

## 2023-03-02 SDOH — ECONOMIC STABILITY: INCOME INSECURITY: HOW HARD IS IT FOR YOU TO PAY FOR THE VERY BASICS LIKE FOOD, HOUSING, MEDICAL CARE, AND HEATING?: NOT VERY HARD

## 2023-03-02 SDOH — ECONOMIC STABILITY: INCOME INSECURITY: IN THE LAST 12 MONTHS, WAS THERE A TIME WHEN YOU WERE NOT ABLE TO PAY THE MORTGAGE OR RENT ON TIME?: NO

## 2023-03-02 SDOH — HEALTH STABILITY: PHYSICAL HEALTH: ON AVERAGE, HOW MANY MINUTES DO YOU ENGAGE IN EXERCISE AT THIS LEVEL?: 30 MIN

## 2023-03-02 SDOH — HEALTH STABILITY: PHYSICAL HEALTH: ON AVERAGE, HOW MANY DAYS PER WEEK DO YOU ENGAGE IN MODERATE TO STRENUOUS EXERCISE (LIKE A BRISK WALK)?: 2 DAYS

## 2023-03-02 SDOH — ECONOMIC STABILITY: FOOD INSECURITY: WITHIN THE PAST 12 MONTHS, YOU WORRIED THAT YOUR FOOD WOULD RUN OUT BEFORE YOU GOT MONEY TO BUY MORE.: NEVER TRUE

## 2023-03-02 ASSESSMENT — ENCOUNTER SYMPTOMS
FREQUENCY: 0
SORE THROAT: 0
HEARTBURN: 0
NAUSEA: 0
COUGH: 0
DYSURIA: 0
ARTHRALGIAS: 0
HEADACHES: 1
JOINT SWELLING: 0
BREAST MASS: 0
HEMATOCHEZIA: 0
FEVER: 0
ABDOMINAL PAIN: 0
SHORTNESS OF BREATH: 0
PALPITATIONS: 0
MYALGIAS: 0
WEAKNESS: 0
CONSTIPATION: 0
HEMATURIA: 0
DIZZINESS: 0
PARESTHESIAS: 0
NERVOUS/ANXIOUS: 0
DIARRHEA: 0
CHILLS: 0
EYE PAIN: 1

## 2023-03-02 ASSESSMENT — SOCIAL DETERMINANTS OF HEALTH (SDOH)
ARE YOU MARRIED, WIDOWED, DIVORCED, SEPARATED, NEVER MARRIED, OR LIVING WITH A PARTNER?: NEVER MARRIED
HOW OFTEN DO YOU ATTEND CHURCH OR RELIGIOUS SERVICES?: NEVER
DO YOU BELONG TO ANY CLUBS OR ORGANIZATIONS SUCH AS CHURCH GROUPS UNIONS, FRATERNAL OR ATHLETIC GROUPS, OR SCHOOL GROUPS?: NO
IN A TYPICAL WEEK, HOW MANY TIMES DO YOU TALK ON THE PHONE WITH FAMILY, FRIENDS, OR NEIGHBORS?: MORE THAN THREE TIMES A WEEK
HOW OFTEN DO YOU GET TOGETHER WITH FRIENDS OR RELATIVES?: TWICE A WEEK

## 2023-03-02 ASSESSMENT — PATIENT HEALTH QUESTIONNAIRE - PHQ9
10. IF YOU CHECKED OFF ANY PROBLEMS, HOW DIFFICULT HAVE THESE PROBLEMS MADE IT FOR YOU TO DO YOUR WORK, TAKE CARE OF THINGS AT HOME, OR GET ALONG WITH OTHER PEOPLE: SOMEWHAT DIFFICULT
SUM OF ALL RESPONSES TO PHQ QUESTIONS 1-9: 7
SUM OF ALL RESPONSES TO PHQ QUESTIONS 1-9: 7

## 2023-03-02 ASSESSMENT — LIFESTYLE VARIABLES
HOW MANY STANDARD DRINKS CONTAINING ALCOHOL DO YOU HAVE ON A TYPICAL DAY: 3 OR 4
HOW OFTEN DO YOU HAVE A DRINK CONTAINING ALCOHOL: 2-4 TIMES A MONTH
HOW OFTEN DO YOU HAVE SIX OR MORE DRINKS ON ONE OCCASION: LESS THAN MONTHLY
SKIP TO QUESTIONS 9-10: 0
AUDIT-C TOTAL SCORE: 4

## 2023-03-02 NOTE — PROGRESS NOTES
SUBJECTIVE:   CC: Afia is an 32 year old who presents for preventive health visit.     Patient has been advised of split billing requirements and indicates understanding: Yes  Healthy Habits:     Getting at least 3 servings of Calcium per day:  Yes    Bi-annual eye exam:  Yes    Dental care twice a year:  NO    Sleep apnea or symptoms of sleep apnea:  Daytime drowsiness    Diet:  Regular (no restrictions)    Frequency of exercise:  6-7 days/week    Duration of exercise:  45-60 minutes    Taking medications regularly:  Yes    Medication side effects:  None    PHQ-2 Total Score: 1    Additional concerns today:  No      Today's PHQ-2 Score:   PHQ-2 ( 1999 [employer]) 3/2/2023   Q1: Little interest or pleasure in doing things 0   Q2: Feeling down, depressed or hopeless 1   PHQ-2 Score 1   PHQ-2 Total Score (12-17 Years)- Positive if 3 or more points; Administer PHQ-A if positive -   Q1: Little interest or pleasure in doing things Not at all   Q2: Feeling down, depressed or hopeless Several days   PHQ-2 Score 1   Mood doing well, rough fall - 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      Sleep: Split shift, 5 hours at a time two times daily, working to regulate better.   Nutrition: Getting fluctuating variety, makes most food at home. Protein drinks at home when schedule.     Social History     Tobacco Use     Smoking status: Every Day     Packs/day: 0.75     Years: 7.00     Pack years: 5.25     Types: Cigarettes     Start date: 5/30/2015     Smokeless tobacco: Never     Tobacco comments:     Started cutting back to quit in 2019, then the pandemic and grad school happened   Substance Use Topics     Alcohol use: Yes     Comment: a couple times a month     Alcohol Use 3/2/2023   Prescreen: >3 drinks/day or >7 drinks/week? No     Sexually active with new partner. Has IUD & uses condoms consistently.     Reviewed  orders with patient.  Reviewed health maintenance and updated orders accordingly - Yes  Lab work is in process  Patient Active Problem List   Diagnosis     Vesicular palmoplantar eczema     Mild single current episode of major depressive disorder (H)     Tobacco abuse     Mirena placed 12/5/19-remove 12/5/24     Cervical high risk HPV (human papillomavirus) test positive     Past Surgical History:   Procedure Laterality Date     BREAST SURGERY  December 4, 2014    Breast Reduction     CHOLECYSTECTOMY  May 25, 2015       Social History     Tobacco Use     Smoking status: Every Day     Packs/day: 0.75     Years: 7.00     Pack years: 5.25     Types: Cigarettes     Start date: 5/30/2015     Smokeless tobacco: Never     Tobacco comments:     Started cutting back to quit in 2019, then the pandemic and grad school happened   Substance Use Topics     Alcohol use: Yes     Comment: a couple times a month     Family History   Problem Relation Age of Onset     Breast Cancer Maternal Grandmother      Anxiety Disorder Maternal Grandmother      Hypertension Mother      C.A.D. Maternal Grandfather         in 80's     Alzheimer Disease Paternal Grandmother      Anxiety Disorder Father      Substance Abuse Father      Prostate Cancer Father      Other Cancer Father         Skin cancer     Asthma Father      Depression Other      Mental Illness Other      Substance Abuse Brother      Glaucoma No family hx of      Macular Degeneration No family hx of            Breast Cancer Screening:    FHS-7:   Breast CA Risk Assessment (FHS-7) 2/20/2022 3/2/2023   Did any of your first-degree relatives have breast or ovarian cancer? No No   Did any of your relatives have bilateral breast cancer? No Unknown   Did any man in your family have breast cancer? No No   Did any woman in your family have breast and ovarian cancer? Yes Unknown   Did any woman in your family have breast cancer before age 50 y? Unknown Unknown   Do you have 2 or more relatives  with breast and/or ovarian cancer? Unknown Unknown   Do you have 2 or more relatives with breast and/or bowel cancer? Unknown Unknown      Patient under 40 years of age: Routine Mammogram Screening not recommended.   Pertinent mammograms are reviewed under the imaging tab.    History of abnormal Pap smear: YES - other categories - see link Cervical Cytology Screening Guidelines   PAP / HPV Latest Ref Rng & Units 2/23/2022 12/20/2018 9/25/2015   PAP   Negative for Intraepithelial Lesion or Malignancy (NILM) - -   PAP (Historical) - - NIL NIL   HPV16 Negative Negative - Negative   HPV18 Negative Negative - Negative   HRHPV Negative Positive(A) - Negative     Reviewed and updated as needed this visit by clinical staff   Tobacco  Allergies  Meds              Reviewed and updated as needed this visit by Provider                 Past Medical History:   Diagnosis Date     Allergic rhinitis, cause unspecified      Cervical high risk HPV (human papillomavirus) test positive 02/23/2022     Depressive disorder 2018     Uncomplicated asthma       Past Surgical History:   Procedure Laterality Date     BREAST SURGERY  December 4, 2014    Breast Reduction     CHOLECYSTECTOMY  May 25, 2015     OB History   No obstetric history on file.       Review of Systems   Constitutional: Negative for chills and fever.   HENT: Negative for congestion, ear pain, hearing loss and sore throat.    Eyes: Positive for pain. Negative for visual disturbance.   Respiratory: Negative for cough and shortness of breath.    Cardiovascular: Negative for chest pain, palpitations and peripheral edema.   Gastrointestinal: Negative for abdominal pain, constipation, diarrhea, heartburn, hematochezia and nausea.   Breasts:  Negative for tenderness, breast mass and discharge.   Genitourinary: Negative for dysuria, frequency, genital sores, hematuria, pelvic pain, urgency, vaginal bleeding and vaginal discharge.   Musculoskeletal: Negative for arthralgias, joint  "swelling and myalgias.   Skin: Negative for rash.   Neurological: Positive for headaches. Negative for dizziness, weakness and paresthesias.   Psychiatric/Behavioral: Negative for mood changes. The patient is not nervous/anxious.         OBJECTIVE:   /84   Pulse 80   Temp 98.5  F (36.9  C)   Ht 1.675 m (5' 5.95\")   Wt 106.6 kg (235 lb)   SpO2 97%   BMI 37.99 kg/m          Physical Exam  GENERAL: healthy, alert and no distress  EYES: Eyes grossly normal to inspection, PERRL and conjunctivae and sclerae normal  HENT: ear canals and TM's normal, nose and mouth without ulcers or lesions  NECK: no adenopathy, no asymmetry, masses, or scars and thyroid normal to palpation  RESP: lungs clear to auscultation - no rales, rhonchi or wheezes  BREAST: normal without masses, tenderness or nipple discharge and no palpable axillary masses or adenopathy  CV: regular rate and rhythm, normal S1 S2, no S3 or S4, no murmur, click or rub, no peripheral edema and peripheral pulses strong  ABDOMEN: soft, nontender, no hepatosplenomegaly, no masses and bowel sounds normal   (female): normal female external genitalia, normal urethral meatus, vaginal mucosa pink, moist, well rugated, and normal cervix/adnexa/uterus without masses or discharge  MS: no gross musculoskeletal defects noted, no edema  SKIN: no suspicious lesions or rashes  NEURO: Normal strength and tone, mentation intact and speech normal  PSYCH: mentation appears normal, affect normal/bright    Diagnostic Test Results:  No results found for this or any previous visit (from the past 24 hour(s)).    ASSESSMENT/PLAN:   (Z00.00) Routine general medical examination at a health care facility  (primary encounter diagnosis)  Comment: Seen today for annual visit. Overall doing ok, no major health changes or concerns since last visit. Health maintenance reviewed and updated as below. Will plan to update general screening labs today. Patient concerns/questions addressed " adequately.    Plan: Chlamydia trachomatis PCR, Neisseria         gonorrhoeae PCR, HIV Antigen Antibody Combo,         Hemoglobin A1c            (F32.0) Mild single current episode of major depressive disorder (H)  Comment: Stable on sertraline dosing. PHQ reviewed.   Plan: sertraline (ZOLOFT) 100 MG tablet            (G47.09) Other insomnia  Comment: Atypical sleep habits with short bursts of 5 hours at most. Often feels tired through awake periods. Is slowly working to shift back towards a more normal schedule. Sleep maintenance/hygeine reviewed   Plan: follow-up on progress next visit     (Z72.0) Tobacco abuse  Comment: Resources offered and patient declined at this time.   Plan: n/a     (Z12.4) Screening for cervical cancer  Comment: Hx of positive high risk HPV on pap in , repeat co-testing today.  exam normal.   Plan: PAP screen with HPV - recommended age 30 - 65         years          (Z13.220) Screening for lipid disorders  Comment: Routine screening for patient, BMI > 35   Plan: Lipid panel reflex to direct LDL Fasting         (Z11.3) Routine screening for STI (sexually transmitted infection)  Comment: Routine screening per patient request, asymptomatic.   Plan: Treponema Abs w Reflex to RPR and Titer             COUNSELING:  Reviewed preventive health counseling, as reflected in patient instructions  Special attention given to:        Regular exercise       Healthy diet/nutrition       Alcohol Use       Contraception       Safe sex practices/STD prevention       HIV screeninx in teen years, 1x in adult years, and at intervals if high risk       One time pneumovax for smokers        She reports that she has been smoking cigarettes. She started smoking about 7 years ago. She has a 5.25 pack-year smoking history. She has never used smokeless tobacco.  Nicotine/Tobacco Cessation Plan:   Information offered: Patient not interested at this time          Christiane Cabrera MD  [facility]  [place]  Answers for HPI/ROS submitted by the patient on 3/2/2023  If you checked off any problems, how difficult have these problems made it for you to do your work, take care of things at home, or get along with other people?: Somewhat difficult  PHQ9 TOTAL SCORE: 7    ===========  STAFF NOTE:  Patient seen with resident physician today.  I was physically present during key portions of the visit and participated in the evaluation and management of the patient today.     Pancho Reid MD

## 2023-03-03 LAB
C TRACH DNA SPEC QL NAA+PROBE: NEGATIVE
N GONORRHOEA DNA SPEC QL NAA+PROBE: NEGATIVE
T PALLIDUM AB SER QL: NONREACTIVE

## 2023-03-06 LAB
BKR LAB AP GYN ADEQUACY: NORMAL
BKR LAB AP GYN INTERPRETATION: NORMAL
BKR LAB AP HPV REFLEX: NORMAL
BKR LAB AP PREVIOUS ABNL DX: NORMAL
BKR LAB AP PREVIOUS ABNORMAL: NORMAL
HIV 1+2 AB+HIV1 P24 AG SERPL QL IA: NONREACTIVE
PATH REPORT.COMMENTS IMP SPEC: NORMAL
PATH REPORT.COMMENTS IMP SPEC: NORMAL
PATH REPORT.RELEVANT HX SPEC: NORMAL

## 2023-03-08 ENCOUNTER — PATIENT OUTREACH (OUTPATIENT)
Dept: PEDIATRICS | Facility: CLINIC | Age: 33
End: 2023-03-08
Payer: COMMERCIAL

## 2023-03-08 DIAGNOSIS — R87.810 CERVICAL HIGH RISK HPV (HUMAN PAPILLOMAVIRUS) TEST POSITIVE: ICD-10-CM

## 2023-03-28 DIAGNOSIS — G47.09 OTHER INSOMNIA: ICD-10-CM

## 2023-03-29 RX ORDER — TRAZODONE HYDROCHLORIDE 50 MG/1
TABLET, FILM COATED ORAL
Qty: 135 TABLET | Refills: 3 | Status: SHIPPED | OUTPATIENT
Start: 2023-03-29 | End: 2024-05-30

## 2023-03-29 NOTE — TELEPHONE ENCOUNTER
Prescription approved per [facility] Refill Protocol.  Rachael Almaraz RN, BSN  [facility]

## 2023-04-27 ENCOUNTER — TELEPHONE (OUTPATIENT)
Dept: PEDIATRICS | Facility: CLINIC | Age: 33
End: 2023-04-27
Payer: COMMERCIAL

## 2023-11-03 ENCOUNTER — MYC MEDICAL ADVICE (OUTPATIENT)
Dept: PEDIATRICS | Facility: CLINIC | Age: 33
End: 2023-11-03
Payer: COMMERCIAL

## 2023-11-09 ENCOUNTER — OFFICE VISIT (OUTPATIENT)
Dept: OBGYN | Facility: CLINIC | Age: 33
End: 2023-11-09
Payer: COMMERCIAL

## 2023-11-09 VITALS — BODY MASS INDEX: 39.13 KG/M2 | DIASTOLIC BLOOD PRESSURE: 80 MMHG | SYSTOLIC BLOOD PRESSURE: 120 MMHG | WEIGHT: 242 LBS

## 2023-11-09 DIAGNOSIS — R87.810 CERVICAL HIGH RISK HPV (HUMAN PAPILLOMAVIRUS) TEST POSITIVE: Primary | ICD-10-CM

## 2023-11-09 PROCEDURE — 57455 BIOPSY OF CERVIX W/SCOPE: CPT | Performed by: OBSTETRICS & GYNECOLOGY

## 2023-11-09 PROCEDURE — 88305 TISSUE EXAM BY PATHOLOGIST: CPT | Performed by: PATHOLOGY

## 2023-11-09 ASSESSMENT — PATIENT HEALTH QUESTIONNAIRE - PHQ9
SUM OF ALL RESPONSES TO PHQ QUESTIONS 1-9: 5
SUM OF ALL RESPONSES TO PHQ QUESTIONS 1-9: 5
10. IF YOU CHECKED OFF ANY PROBLEMS, HOW DIFFICULT HAVE THESE PROBLEMS MADE IT FOR YOU TO DO YOUR WORK, TAKE CARE OF THINGS AT HOME, OR GET ALONG WITH OTHER PEOPLE: SOMEWHAT DIFFICULT

## 2023-11-09 NOTE — PROGRESS NOTES
Afia Cook is a 33 year old female who presents for colposcopy. Pap smear history:    2/23/22 NIL Pap, + HR HPV (neg 16/18). Plan cotest in 1 year.   3/2/23 NIL Pap, + HR HPV (neg 16/18). [place] due by 6/2/23  3/8/2023 Pt viewed result on MyChart.  6/5/23 appt - no-show  7/5/2023 Reminder MyChart  8/4/23 [place] not done. Tracking updated for 6 mo colp/pap due 9/2/23.      Past Medical History:   Diagnosis Date    Allergic rhinitis, cause unspecified     Cervical high risk HPV (human papillomavirus) test positive 02/23/2022    Depressive disorder 2018    Uncomplicated asthma      Family History   Problem Relation Age of Onset    Breast Cancer Maternal Grandmother     Anxiety Disorder Maternal Grandmother     Hypertension Mother     C.A.D. Maternal Grandfather         in 80's    Alzheimer Disease Paternal Grandmother     Anxiety Disorder Father     Substance Abuse Father     Prostate Cancer Father     Other Cancer Father         Skin cancer    Asthma Father     Depression Other     Mental Illness Other     Substance Abuse Brother     Glaucoma No family hx of     Macular Degeneration No family hx of        Previous history of abnormal paps?: Yes see above.  History of genital warts?: No  Visible warts now?:  No       No LMP recorded. (Menstrual status: IUD).  Type of contraception: IUD Mirena  History   Smoking Status    Every Day    Packs/day: 0.75    Years: 7.00    Types: Cigarettes    Start date: 5/30/2015   Smokeless Tobacco    Never     Allergies as of 11/09/2023 - Reviewed 11/09/2023   Allergen Reaction Noted    Ambrosia artemisiifolia (ragweed) skin test Fatigue, Hives, and Itching 03/02/2023    No known drug allergy  09/14/2004        PROCEDURE:  Before the procedure,  the patient was educated regarding the nature of her findings to date, the implications of them, and recommendations for evaluation based on latest ASCCP/ACOG guidelines. The details of the colposcopic procedure were reviewed, as well as the risks  of missed diagnoses, pain, infection and bleeding. All questions were answered before proceeding, and informed consent was therefore obtained.    With the patient in the dorsal lithotomy position, a speculum was used to visualize the cervix. After initial exam, acetic acid 5% was applied to the cervix and vaginal sidewalls.    Unenhanced examination of the cervix was normal without lesions.  Pap smear and endocervical sampling not obtained due to:    not due  Pap repeated?:  No  SCJ seen?:  yes  Endocervical speculum needed?:  No  ECC done?:  No  Lugol's solution used?:  No  Satisfactory examination?:  yes    Vaginal vault: normal without lesion  Urethra normal?:  yes  Labia normal?:  yes  Perineum normal?:  yes  Rectum normal?:  yes    FINDINGS:  Cervix: acetowhitening noted 3:00  Procedure: biopsies taken (not including ECC): 1.    Procedure summary: Patient tolerated procedure well     Assessment: HPV related changes and EUGENIO 1      Plan: Specimens labelled and sent to pathology.  Will base further treatment on pathology findings.  Post biopsy instructions given to patient.    If EUGENIO 1 or less, plan repeat pap and HPV in 1 year. If EUGENIO 2 or higher, plan LEEP.        Roxanne Servin MD

## 2023-11-09 NOTE — NURSING NOTE
"Chief Complaint   Patient presents with    Colposcopy     +HR HPV       Initial /80   Wt 109.8 kg (242 lb)   BMI 39.13 kg/m   Estimated body mass index is 39.13 kg/m  as calculated from the following:    Height as of 3/2/23: 1.675 m (5' 5.95\").    Weight as of this encounter: 109.8 kg (242 lb).  BP completed using cuff size: large    Questioned patient about current smoking habits.  Pt. currently smokes.  Advised about smoking cessation.        "

## 2023-11-14 LAB
PATH REPORT.COMMENTS IMP SPEC: NORMAL
PATH REPORT.FINAL DX SPEC: NORMAL
PATH REPORT.GROSS SPEC: NORMAL
PATH REPORT.MICROSCOPIC SPEC OTHER STN: NORMAL
PATH REPORT.RELEVANT HX SPEC: NORMAL
PHOTO IMAGE: NORMAL

## 2023-11-27 ENCOUNTER — PATIENT OUTREACH (OUTPATIENT)
Dept: PEDIATRICS | Facility: CLINIC | Age: 33
End: 2023-11-27
Payer: COMMERCIAL

## 2023-11-27 DIAGNOSIS — R87.810 CERVICAL HIGH RISK HPV (HUMAN PAPILLOMAVIRUS) TEST POSITIVE: Primary | ICD-10-CM

## 2024-01-19 NOTE — PATIENT INSTRUCTIONS
Continue 50mg dose.    FOLLOW UP in late September, early October for physical with PAP (also consider increasing the zoloft does).   Benzoyl Peroxide Pregnancy And Lactation Text: This medication is Pregnancy Category C. It is unknown if benzoyl peroxide is excreted in breast milk.

## 2024-02-06 ENCOUNTER — MYC MEDICAL ADVICE (OUTPATIENT)
Dept: PEDIATRICS | Facility: CLINIC | Age: 34
End: 2024-02-06

## 2024-02-14 ENCOUNTER — PATIENT OUTREACH (OUTPATIENT)
Dept: CARE COORDINATION | Facility: CLINIC | Age: 34
End: 2024-02-14

## 2024-02-28 ENCOUNTER — PATIENT OUTREACH (OUTPATIENT)
Dept: CARE COORDINATION | Facility: CLINIC | Age: 34
End: 2024-02-28

## 2024-04-14 ENCOUNTER — HEALTH MAINTENANCE LETTER (OUTPATIENT)
Age: 34
End: 2024-04-14

## 2024-05-30 ENCOUNTER — MYC MEDICAL ADVICE (OUTPATIENT)
Dept: PEDIATRICS | Facility: CLINIC | Age: 34
End: 2024-05-30

## 2024-05-30 DIAGNOSIS — F32.0 MILD SINGLE CURRENT EPISODE OF MAJOR DEPRESSIVE DISORDER (H): ICD-10-CM

## 2024-05-30 DIAGNOSIS — G47.09 OTHER INSOMNIA: ICD-10-CM

## 2024-05-30 RX ORDER — TRAZODONE HYDROCHLORIDE 50 MG/1
TABLET, FILM COATED ORAL
Qty: 135 TABLET | Refills: 0 | Status: SHIPPED | OUTPATIENT
Start: 2024-05-30 | End: 2024-07-19

## 2024-05-30 RX ORDER — SERTRALINE HYDROCHLORIDE 100 MG/1
150 TABLET, FILM COATED ORAL DAILY
Qty: 135 TABLET | Refills: 0 | Status: SHIPPED | OUTPATIENT
Start: 2024-05-30 | End: 2024-07-19

## 2024-07-19 ENCOUNTER — OFFICE VISIT (OUTPATIENT)
Dept: PEDIATRICS | Facility: CLINIC | Age: 34
End: 2024-07-19
Payer: COMMERCIAL

## 2024-07-19 VITALS
OXYGEN SATURATION: 94 % | DIASTOLIC BLOOD PRESSURE: 64 MMHG | TEMPERATURE: 98.7 F | BODY MASS INDEX: 38.41 KG/M2 | RESPIRATION RATE: 16 BRPM | HEIGHT: 66 IN | HEART RATE: 95 BPM | SYSTOLIC BLOOD PRESSURE: 123 MMHG | WEIGHT: 239 LBS

## 2024-07-19 DIAGNOSIS — G47.09 OTHER INSOMNIA: ICD-10-CM

## 2024-07-19 DIAGNOSIS — R41.840 ATTENTION DISTURBANCE: ICD-10-CM

## 2024-07-19 DIAGNOSIS — F32.0 MILD SINGLE CURRENT EPISODE OF MAJOR DEPRESSIVE DISORDER (H): ICD-10-CM

## 2024-07-19 DIAGNOSIS — Z13.220 SCREENING CHOLESTEROL LEVEL: ICD-10-CM

## 2024-07-19 DIAGNOSIS — Z00.00 ROUTINE GENERAL MEDICAL EXAMINATION AT A HEALTH CARE FACILITY: Primary | ICD-10-CM

## 2024-07-19 DIAGNOSIS — Z11.3 SCREEN FOR STD (SEXUALLY TRANSMITTED DISEASE): ICD-10-CM

## 2024-07-19 DIAGNOSIS — Z23 NEED FOR VACCINATION: ICD-10-CM

## 2024-07-19 LAB
ALBUMIN SERPL BCG-MCNC: 4.5 G/DL (ref 3.5–5.2)
ALP SERPL-CCNC: 100 U/L (ref 40–150)
ALT SERPL W P-5'-P-CCNC: 22 U/L (ref 0–50)
ANION GAP SERPL CALCULATED.3IONS-SCNC: 11 MMOL/L (ref 7–15)
AST SERPL W P-5'-P-CCNC: 21 U/L (ref 0–45)
BILIRUB SERPL-MCNC: 0.3 MG/DL
BUN SERPL-MCNC: 11.6 MG/DL (ref 6–20)
CALCIUM SERPL-MCNC: 9.6 MG/DL (ref 8.8–10.4)
CHLORIDE SERPL-SCNC: 102 MMOL/L (ref 98–107)
CHOLEST SERPL-MCNC: 177 MG/DL
CREAT SERPL-MCNC: 0.63 MG/DL (ref 0.51–0.95)
EGFRCR SERPLBLD CKD-EPI 2021: >90 ML/MIN/1.73M2
FASTING STATUS PATIENT QL REPORTED: ABNORMAL
FASTING STATUS PATIENT QL REPORTED: NORMAL
GLUCOSE SERPL-MCNC: 90 MG/DL (ref 70–99)
HBA1C MFR BLD: 5.6 % (ref 0–5.6)
HCO3 SERPL-SCNC: 24 MMOL/L (ref 22–29)
HCV AB SERPL QL IA: NONREACTIVE
HDLC SERPL-MCNC: 33 MG/DL
HIV 1+2 AB+HIV1 P24 AG SERPL QL IA: NONREACTIVE
LDLC SERPL CALC-MCNC: 109 MG/DL
NONHDLC SERPL-MCNC: 144 MG/DL
POTASSIUM SERPL-SCNC: 3.9 MMOL/L (ref 3.4–5.3)
PROT SERPL-MCNC: 7.5 G/DL (ref 6.4–8.3)
SODIUM SERPL-SCNC: 137 MMOL/L (ref 135–145)
TRIGL SERPL-MCNC: 176 MG/DL

## 2024-07-19 PROCEDURE — 86780 TREPONEMA PALLIDUM: CPT | Performed by: INTERNAL MEDICINE

## 2024-07-19 PROCEDURE — 80053 COMPREHEN METABOLIC PANEL: CPT | Performed by: INTERNAL MEDICINE

## 2024-07-19 PROCEDURE — 87389 HIV-1 AG W/HIV-1&-2 AB AG IA: CPT | Performed by: INTERNAL MEDICINE

## 2024-07-19 PROCEDURE — 87591 N.GONORRHOEAE DNA AMP PROB: CPT | Performed by: INTERNAL MEDICINE

## 2024-07-19 PROCEDURE — 36415 COLL VENOUS BLD VENIPUNCTURE: CPT | Performed by: INTERNAL MEDICINE

## 2024-07-19 PROCEDURE — 99213 OFFICE O/P EST LOW 20 MIN: CPT | Mod: 25 | Performed by: INTERNAL MEDICINE

## 2024-07-19 PROCEDURE — 80061 LIPID PANEL: CPT | Performed by: INTERNAL MEDICINE

## 2024-07-19 PROCEDURE — 99395 PREV VISIT EST AGE 18-39: CPT | Performed by: INTERNAL MEDICINE

## 2024-07-19 PROCEDURE — 87491 CHLMYD TRACH DNA AMP PROBE: CPT | Performed by: INTERNAL MEDICINE

## 2024-07-19 PROCEDURE — 86803 HEPATITIS C AB TEST: CPT | Performed by: INTERNAL MEDICINE

## 2024-07-19 PROCEDURE — 83036 HEMOGLOBIN GLYCOSYLATED A1C: CPT | Performed by: INTERNAL MEDICINE

## 2024-07-19 RX ORDER — TRAZODONE HYDROCHLORIDE 50 MG/1
75 TABLET, FILM COATED ORAL AT BEDTIME
Qty: 135 TABLET | Refills: 3 | Status: SHIPPED | OUTPATIENT
Start: 2024-07-19

## 2024-07-19 RX ORDER — SERTRALINE HYDROCHLORIDE 100 MG/1
150 TABLET, FILM COATED ORAL DAILY
Qty: 135 TABLET | Refills: 3 | Status: SHIPPED | OUTPATIENT
Start: 2024-07-19

## 2024-07-19 SDOH — HEALTH STABILITY: PHYSICAL HEALTH: ON AVERAGE, HOW MANY MINUTES DO YOU ENGAGE IN EXERCISE AT THIS LEVEL?: 20 MIN

## 2024-07-19 SDOH — HEALTH STABILITY: PHYSICAL HEALTH: ON AVERAGE, HOW MANY DAYS PER WEEK DO YOU ENGAGE IN MODERATE TO STRENUOUS EXERCISE (LIKE A BRISK WALK)?: 3 DAYS

## 2024-07-19 ASSESSMENT — PAIN SCALES - GENERAL: PAINLEVEL: NO PAIN (0)

## 2024-07-19 ASSESSMENT — SOCIAL DETERMINANTS OF HEALTH (SDOH): HOW OFTEN DO YOU GET TOGETHER WITH FRIENDS OR RELATIVES?: ONCE A WEEK

## 2024-07-19 NOTE — PATIENT INSTRUCTIONS
Hep A and Prevnar 20 today.    This fall:  Flu and COVID.    Mammograms age 40.    Colonoscopy age 45.    Pap smear thru OB/GYN.    Call for an appointment for ADHD evaluation.    Lab work today:  We can do labs in the exam room today, or you can get them done downstairs in the lab.            Patient Education   Preventive Care Advice   This is general advice given by our system to help you stay healthy. However, your care team may have specific advice just for you. Please talk to your care team about your preventive care needs.  Nutrition  Eat 5 or more servings of fruits and vegetables each day.  Try wheat bread, brown rice and whole grain pasta (instead of white bread, rice, and pasta).  Get enough calcium and vitamin D. Check the label on foods and aim for 100% of the RDA (recommended daily allowance).  Lifestyle  Exercise at least 150 minutes each week  (30 minutes a day, 5 days a week).  Do muscle strengthening activities 2 days a week. These help control your weight and prevent disease.  No smoking.  Wear sunscreen to prevent skin cancer.  Have a dental exam and cleaning every 6 months.  Yearly exams  See your health care team every year to talk about:  Any changes in your health.  Any medicines your care team has prescribed.  Preventive care, family planning, and ways to prevent chronic diseases.  Shots (vaccines)   HPV shots (up to age 26), if you've never had them before.  Hepatitis B shots (up to age 59), if you've never had them before.  COVID-19 shot: Get this shot when it's due.  Flu shot: Get a flu shot every year.  Tetanus shot: Get a tetanus shot every 10 years.  Pneumococcal, hepatitis A, and RSV shots: Ask your care team if you need these based on your risk.  Shingles shot (for age 50 and up)  General health tests  Diabetes screening:  Starting at age 35, Get screened for diabetes at least every 3 years.  If you are younger than age 35, ask your care team if you should be screened for  diabetes.  Cholesterol test: At age 39, start having a cholesterol test every 5 years, or more often if advised.  Bone density scan (DEXA): At age 50, ask your care team if you should have this scan for osteoporosis (brittle bones).  Hepatitis C: Get tested at least once in your life.  STIs (sexually transmitted infections)  Before age 24: Ask your care team if you should be screened for STIs.  After age 24: Get screened for STIs if you're at risk. You are at risk for STIs (including HIV) if:  You are sexually active with more than one person.  You don't use condoms every time.  You or a partner was diagnosed with a sexually transmitted infection.  If you are at risk for HIV, ask about PrEP medicine to prevent HIV.  Get tested for HIV at least once in your life, whether you are at risk for HIV or not.  Cancer screening tests  Cervical cancer screening: If you have a cervix, begin getting regular cervical cancer screening tests starting at age 21.  Breast cancer scan (mammogram): If you've ever had breasts, begin having regular mammograms starting at age 40. This is a scan to check for breast cancer.  Colon cancer screening: It is important to start screening for colon cancer at age 45.  Have a colonoscopy test every 10 years (or more often if you're at risk) Or, ask your provider about stool tests like a FIT test every year or Cologuard test every 3 years.  To learn more about your testing options, visit:   .  For help making a decision, visit:   https://bit.ly/am46017.  Prostate cancer screening test: If you have a prostate, ask your care team if a prostate cancer screening test (PSA) at age 55 is right for you.  Lung cancer screening: If you are a current or former smoker ages 50 to 80, ask your care team if ongoing lung cancer screenings are right for you.  For informational purposes only. Not to replace the advice of your health care provider. Copyright   2023 [employer]. All rights reserved.  Clinically reviewed by the [facility] Transitions Program. [employer] [id number] - REV 01/24.

## 2024-07-19 NOTE — PROGRESS NOTES
Preventive Care Visit  [facility] MD, Internal Medicine - Pediatrics  Jul 19, 2024      Assessment & Plan     Screen for STD (sexually transmitted disease)  Per patient request.   - NEISSERIA GONORRHOEA PCR  - CHLAMYDIA TRACHOMATIS PCR  - Treponema Abs w Reflex to RPR and Titer; Future  - HIV Antigen Antibody Combo; Future  - Hepatitis C Screen Reflex to HCV RNA Quant and Genotype; Future  - Treponema Abs w Reflex to RPR and Titer  - HIV Antigen Antibody Combo  - Hepatitis C Screen Reflex to HCV RNA Quant and Genotype    Screening cholesterol level    - Hemoglobin A1c; Future  - Comprehensive metabolic panel (BMP + Alb, Alk Phos, ALT, AST, Total. Bili, TP); Future  - Lipid panel reflex to direct LDL Fasting; Future  - Hemoglobin A1c  - Comprehensive metabolic panel (BMP + Alb, Alk Phos, ALT, AST, Total. Bili, TP)  - Lipid panel reflex to direct LDL Fasting    Routine general medical examination at a health care facility  Discussed diet, exercise, breast self exam, blood pressure, cholesterol, calcium supplementation and osteoporosis, and need for cancer surveillance at appropriate ages.     Attention disturbance  Requesting full evaluation; says symptoms since childhood, but never diagnosed with ADHD.   - Adult Mental Health  Referral; Future    Mild single current episode of major depressive disorder (H24)  Symptoms stable on zoloft.   - sertraline (ZOLOFT) 100 MG tablet; Take 1.5 tablets (150 mg) by mouth daily    Other insomnia  Stable on trazodone  - traZODone (DESYREL) 50 MG tablet; Take 1.5 tablets (75 mg) by mouth at bedtime    Patient declined discussion about weight and tobacco use.     Patient has been advised of split billing requirements and indicates understanding: Yes        Nicotine/Tobacco Cessation  She reports that she has been smoking cigarettes. She started smoking about 9 years ago. She has a 6.9 pack-year smoking history. She has been exposed to  "tobacco smoke. She has never used smokeless tobacco.  Nicotine/Tobacco Cessation Plan  Information offered: Patient not interested at this time      BMI  Estimated body mass index is 39.11 kg/m  as calculated from the following:    Height as of this encounter: 1.665 m (5' 5.55\").    Weight as of this encounter: 108.4 kg (239 lb).   Weight management plan: see above    Counseling  Appropriate preventive services were addressed with this patient via screening, questionnaire, or discussion as appropriate for fall prevention, nutrition, physical activity, Tobacco-use cessation, weight loss and cognition.  Checklist reviewing preventive services available has been given to the patient.  Reviewed patient's diet, addressing concerns and/or questions.   She is at risk for lack of exercise and has been provided with information to increase physical activity for the benefit of her well-being.           Tonia Oreilly is a 34 year old, presenting for the following:  Physical (Non Fasting )        7/19/2024    10:51 AM   Additional Questions   Roomed by JUAN PABLO Encinas   Accompanied by n/a         7/19/2024    10:51 AM   Patient Reported Additional Medications   Patient reports taking the following new medications n/a          HPI  Has a slow dog.  3 hours per day.    Diet: generally healthy.                7/19/2024   General Health   How would you rate your overall physical health? Good   Feel stress (tense, anxious, or unable to sleep) To some extent      (!) STRESS CONCERN      7/19/2024   Nutrition   Three or more servings of calcium each day? Yes   Diet: Regular (no restrictions)   How many servings of fruit and vegetables per day? (!) 0-1   How many sweetened beverages each day? 0-1            7/19/2024   Exercise   Days per week of moderate/strenous exercise 3 days   Average minutes spent exercising at this level 20 min            7/19/2024   Social Factors   Frequency of gathering with friends or relatives Once a week "   Worry food won't last until get money to buy more No   Food not last or not have enough money for food? No   Do you have housing? (Housing is defined as stable permanent housing and does not include staying ouside in a car, in a tent, in an abandoned building, in an overnight shelter, or couch-surfing.) Yes   Are you worried about losing your housing? No   Lack of transportation? No   Unable to get utilities (heat,electricity)? No            7/19/2024   Dental   Dentist two times every year? Yes            7/16/2024   TB Screening   Were you born outside of the [place]? No            Today's PHQ-9 Score:       7/16/2024     1:58 AM   PHQ-9 SCORE   PHQ-9 Total Score MyChart 6 (Mild depression)   PHQ-9 Total Score 6           7/19/2024   Substance Use   Alcohol more than 3/day or more than 7/wk No   Do you use any other substances recreationally? No        Social History     Tobacco Use    Smoking status: Every Day     Current packs/day: 0.75     Average packs/day: 0.8 packs/day for 9.1 years (6.9 ttl pk-yrs)     Types: Cigarettes     Start date: 5/30/2015     Passive exposure: Current    Smokeless tobacco: Never    Tobacco comments:     Started cutting back to quit in 2019, then the pandemic and grad school happened   Vaping Use    Vaping status: Never Used   Substance Use Topics    Alcohol use: Yes     Comment: a couple times a month    Drug use: No           3/2/2023   LAST FHS-7 RESULTS   1st degree relative breast or ovarian cancer No   Any relative bilateral breast cancer Unknown   Any male have breast cancer No   Any ONE woman have BOTH breast AND ovarian cancer Unknown   Any woman with breast cancer before 50yrs Unknown   2 or more relatives with breast AND/OR ovarian cancer Unknown   2 or more relatives with breast AND/OR bowel cancer Unknown                   7/19/2024   STI Screening   New sexual partner(s) since last STI/HIV test? No        History of abnormal Pap smear: plan per OBGYN.         Latest Ref Rng  "& Units 3/2/2023     1:54 PM 2/23/2022     5:52 PM 12/20/2018    11:44 AM   PAP / HPV   PAP  Negative for Intraepithelial Lesion or Malignancy (NILM)  Negative for Intraepithelial Lesion or Malignancy (NILM)     PAP (Historical)    NIL    HPV 16 DNA Negative Negative  Negative     HPV 18 DNA Negative Negative  Negative     Other HR HPV Negative Positive  Positive             7/19/2024   Contraception/Family Planning   Questions about contraception or family planning No           Reviewed and updated as needed this visit by Provider                    Past Medical History:   Diagnosis Date    Allergic rhinitis, cause unspecified     Cervical high risk HPV (human papillomavirus) test positive 02/23/2022    Depressive disorder 2018    Uncomplicated asthma      Past Surgical History:   Procedure Laterality Date    BREAST SURGERY  December 4, 2014    Breast Reduction    CHOLECYSTECTOMY  May 25, 2015         Review of Systems  Constitutional, HEENT, cardiovascular, pulmonary, GI, , musculoskeletal, neuro, skin, endocrine and psych systems are negative, except as otherwise noted.     Objective    Exam  /64   Pulse 95   Temp 98.7  F (37.1  C) (Tympanic)   Resp 16   Ht 1.665 m (5' 5.55\")   Wt 108.4 kg (239 lb)   LMP  (LMP Unknown)   SpO2 94%   Breastfeeding No   BMI 39.11 kg/m     Estimated body mass index is 39.11 kg/m  as calculated from the following:    Height as of this encounter: 1.665 m (5' 5.55\").    Weight as of this encounter: 108.4 kg (239 lb).    Physical Exam  GENERAL: alert and no distress  EYES: Eyes grossly normal to inspection, PERRL and conjunctivae and sclerae normal  HENT: ear canals and TM's normal, nose and mouth without ulcers or lesions  NECK: no adenopathy, no asymmetry, masses, or scars  RESP: lungs clear to auscultation - no rales, rhonchi or wheezes  CV: regular rate and rhythm, normal S1 S2, no S3 or S4, no murmur, click or rub, no peripheral edema  ABDOMEN: soft, nontender, no " hepatosplenomegaly, no masses and bowel sounds normal  MS: no gross musculoskeletal defects noted, no edema  SKIN: no suspicious lesions or rashes  NEURO: Normal strength and tone, mentation intact and speech normal  PSYCH: mentation appears normal, affect normal/bright        Signed Electronically by: Steve North MD

## 2024-07-24 ENCOUNTER — ALLIED HEALTH/NURSE VISIT (OUTPATIENT)
Dept: PEDIATRICS | Facility: CLINIC | Age: 34
End: 2024-07-24
Payer: COMMERCIAL

## 2024-07-24 DIAGNOSIS — Z23 NEED FOR VACCINATION: Primary | ICD-10-CM

## 2024-07-24 PROCEDURE — 99207 PR NO CHARGE NURSE ONLY: CPT

## 2024-10-22 ENCOUNTER — PATIENT OUTREACH (OUTPATIENT)
Dept: PEDIATRICS | Facility: CLINIC | Age: 34
End: 2024-10-22
Payer: COMMERCIAL

## 2024-10-22 NOTE — TELEPHONE ENCOUNTER
Patient due for Pap and HPV.    Reminder done today via [employer].    11/11/24 visit scheduled for IUD replacement - notes added

## 2024-11-20 ENCOUNTER — OFFICE VISIT (OUTPATIENT)
Dept: MIDWIFE SERVICES | Facility: CLINIC | Age: 34
End: 2024-11-20
Payer: COMMERCIAL

## 2024-11-20 VITALS — BODY MASS INDEX: 38.84 KG/M2 | SYSTOLIC BLOOD PRESSURE: 122 MMHG | WEIGHT: 237.4 LBS | DIASTOLIC BLOOD PRESSURE: 84 MMHG

## 2024-11-20 DIAGNOSIS — Z97.5 IUD (INTRAUTERINE DEVICE) IN PLACE: ICD-10-CM

## 2024-11-20 DIAGNOSIS — N92.0 INTERMENSTRUAL SPOTTING DUE TO INTRAUTERINE DEVICE (IUD): ICD-10-CM

## 2024-11-20 DIAGNOSIS — Z30.433 ENCOUNTER FOR REMOVAL AND REINSERTION OF INTRAUTERINE CONTRACEPTIVE DEVICE: Primary | ICD-10-CM

## 2024-11-20 DIAGNOSIS — Z97.5 INTERMENSTRUAL SPOTTING DUE TO INTRAUTERINE DEVICE (IUD): ICD-10-CM

## 2024-11-20 DIAGNOSIS — Z12.4 SCREENING FOR CERVICAL CANCER: ICD-10-CM

## 2024-11-20 NOTE — PATIENT INSTRUCTIONS
Your Intrauterine Device (IUD)     What to watch for right after IUD placement:    Some women may experience uterine cramps, bleeding, and/or dizziness during and right after IUD placement.     To help minimize the cramps, you may take ibuprofen 600 mg with food. These symptoms should improve over the next 24 hours.  Mild cramping may be present for a few days after IUD placement. You may continue taking ibuprofen as directed if needed.    You may experience spotting or bleeding for the first few weeks to months after IUD placement.      Other side effects can include:  Anemia, hemorrhage, partial or complete expulsion of device, uterine or cervical perforation, embedding of IUD in the uterine wall, increased risk of pelvic inflammatory disease.  Women who become pregnant with an IUD in place are at higher risk of an ectopic pregnancy.  There is also a higher risk of miscarriage when pregnancy occurs with an IUD in place.    Use condoms or abstain from sex for 7 days after the insertion of your Mirena or Kyleena or Abby  IUD.  This is not necessary if you had a ParaGard IUD placed.    If you experience fever, chills, abdominal pain, worsening pelvic pain, dizziness, unusually heavy vaginal bleeding, suspected expulsion of device or foul smelling vaginal discharge please call the clinic for evaluation.    Please schedule an appointment at the clinic for a string check 4-6 weeks after IUD placement    Your periods may change (Abby/Kyleena/Mirena):    For the first 3 to 6 months, your monthly period may become irregular. You may also have frequent spotting or light bleeding. A few women have heavy bleeding during this time. After your body adjusts, the number of bleeding days is likely to decrease (but may remain irregular), and you may even find that your periods stop altogether for as long as Abby/Mirena is in place.  Your periods will return rapidly once the IUD is removed.      ParaGard IUD users:    ParaGard  IUD users may experience heavier than normal cycles while their IUD is in place, this is considered normal   Back-up contraception is not needed      Checking for your strings:    We encourage everyone with an IUD in place to check for their strings monthly    You may check your own IUD strings by inserting a finger into the vagina and feeling the strings as they exit the cervix.  The strings will initially feel firm, like fishing line, but will soften over a few weeks.  After the strings have softened, you or your partner should not be able to feel the strings during intercourse.     If the string length greatly changes or if you cannot feel your strings at all please make an appointment to see you midwife and use a backup method of contraception like a condom.    If you can feel something hard/plastic like the IUD may not be in the correct place. You should then see your healthcare provider to have the position confirmed with ultrasound.       Remember:    IUD's do not protect against HIV or STIs.  IUD's do not prevent the formation of ovarian cysts.  IUD's do not typically reduce acne or cause weight gain or mood changes.    For more information:  [email or web address]-us.com/    The ParaGard IUD is effective for 10 years.  The Abby IUD is effective for 3 years.  The Kyleena IUD is effective for 5 years.  The Mirena IUD is effective for 8 years.  Your IUD can easily be removed by a healthcare professional at any time.    Do not try to remove the IUD yourself.      If you have questions or concerns please call:    [facility]   [phone]

## 2024-11-20 NOTE — PROGRESS NOTES
SUBJECTIVE:    Is a pregnancy test required: No. IUD is not   Was a consent obtained?  Yes    Subjective: Afia Cook is a 34 year old  presents for IUD and desires Mirena type IUD.  She requests removal of the IUD because  spotting between cycles and now regular period before no bleeding at all  She is also due for pap, abnormal last November and due for repeat.    Patient has been given the opportunity to ask questions about all forms of birth control, including all options appropriate for Afia Cook. Discussed that no method of birth control, except abstinence is 100% effective against pregnancy or sexually transmitted infection.     Afia Cook understands she may have the IUD removed at any time. IUD should be removed by a health care provider and the current IUD will be removed today.    The entire removal and insertion procedure was reviewed with the patient, including care after placement. Discussed risks including migration, perforation, expulsion and infection.    Today's PHQ-2 Score:       3/2/2023     4:24 AM   PHQ-2 (  [employer])   Q1: Little interest or pleasure in doing things 0    Q2: Feeling down, depressed or hopeless 1    PHQ-2 Score 1   Q1: Little interest or pleasure in doing things Not at all   Q2: Feeling down, depressed or hopeless Several days   PHQ-2 Score 1       Patient-reported   /84 (BP Location: Right arm, Patient Position: Sitting, Cuff Size: Adult Large)   Wt 107.7 kg (237 lb 6.4 oz)   BMI 38.84 kg/m      PELVIC EXAM:  Vulva: No external lesions, BUS WNL  Vagina: Moist, pink, discharge normal  well rugated, no lesions  Cervix:posterior, smooth, pink, no visible lesions, neg CMT, pap obtained, IUD strings visualized  Uterus: Normal size, mid position, non-tender, mobile  Ovaries: No mass, non-tender  Rectal exam: deferred      PROCEDURE:    Did not premedicate  A speculum exam was performed and the cervix was visualized. The IUD string was visualized.  Using ring forceps, the string  was grasped and the IUD removed intact.    Under sterile technique, cervix was visualized with speculum and prepped with Betadine solution swab x 3. The uterus was gently straightened and sounded to 7.5 cm. IUD prepared for placement, and IUD inserted according to 's instructions without difficulty or significant ressitance, and deployed at the fundus. The strings were visualized and trimmed to 3.0 cm from the external os. Tenaculum was removed and hemostasis noted. Speculum removed.  Patient tolerated procedure well.    EBL: minimal    Complications: none    LOT: [id number]  EXP: 12/01/2026      POST PROCEDURE/Counseling:  Given handout via [employer], reminder card given due for removal 11/2032  Discussed can be removed and replaced after 5 years for dysfunction uterine bleeding   Reviewed warning signs and what to watch for  Discussed she can check for strings as needed and that IUD can always be check with US  Encouraged condom use for prevention of STD.   Advised to call for any fever, for prolonged or severe pain or bleeding, abnormal vaginal dischage, or unable to palpate strings.   Discussed using tylenol or iburpofen as needed for cramping/discomfort  Advised to follow-up in clinic in 6-12 weeks for IUD string check if unable to find strings or as directed by provider or sooner if other problems arise  Pap obtained, plan for follow up pending results     VESTA Viera CNM

## 2024-11-25 LAB
HPV HR 12 DNA CVX QL NAA+PROBE: POSITIVE
HPV16 DNA CVX QL NAA+PROBE: NEGATIVE
HPV18 DNA CVX QL NAA+PROBE: NEGATIVE
HUMAN PAPILLOMA VIRUS FINAL DIAGNOSIS: ABNORMAL

## 2024-12-02 ENCOUNTER — PATIENT OUTREACH (OUTPATIENT)
Dept: MIDWIFE SERVICES | Facility: CLINIC | Age: 34
End: 2024-12-02
Payer: COMMERCIAL

## 2024-12-02 DIAGNOSIS — R87.810 CERVICAL HIGH RISK HPV (HUMAN PAPILLOMAVIRUS) TEST POSITIVE: Primary | ICD-10-CM

## 2025-06-19 ENCOUNTER — PATIENT OUTREACH (OUTPATIENT)
Dept: CARE COORDINATION | Facility: CLINIC | Age: 35
End: 2025-06-19

## 2025-08-07 DIAGNOSIS — G47.09 OTHER INSOMNIA: ICD-10-CM

## 2025-08-07 RX ORDER — TRAZODONE HYDROCHLORIDE 50 MG/1
TABLET ORAL
Qty: 135 TABLET | Refills: 0 | Status: SHIPPED | OUTPATIENT
Start: 2025-08-07

## 2025-08-23 ENCOUNTER — HEALTH MAINTENANCE LETTER (OUTPATIENT)
Age: 35
End: 2025-08-23